# Patient Record
Sex: FEMALE | Employment: FULL TIME | URBAN - METROPOLITAN AREA
[De-identification: names, ages, dates, MRNs, and addresses within clinical notes are randomized per-mention and may not be internally consistent; named-entity substitution may affect disease eponyms.]

---

## 2017-05-11 LAB — MAMMOGRAM: NORMAL

## 2019-06-11 ENCOUNTER — DOCUMENTATION ONLY (OUTPATIENT)
Dept: CARE COORDINATION | Facility: CLINIC | Age: 54
End: 2019-06-11

## 2019-06-12 ENCOUNTER — OFFICE VISIT (OUTPATIENT)
Dept: DERMATOLOGY | Facility: CLINIC | Age: 54
End: 2019-06-12
Payer: COMMERCIAL

## 2019-06-12 DIAGNOSIS — Z79.899 ENCOUNTER FOR LONG-TERM (CURRENT) USE OF HIGH-RISK MEDICATION: ICD-10-CM

## 2019-06-12 DIAGNOSIS — L40.3 PUSTULAR PSORIASIS OF PALMS AND SOLES: Primary | ICD-10-CM

## 2019-06-12 DIAGNOSIS — B35.1 ONYCHOMYCOSIS: ICD-10-CM

## 2019-06-12 LAB
ALBUMIN SERPL-MCNC: 4 G/DL (ref 3.4–5)
ALP SERPL-CCNC: 78 U/L (ref 40–150)
ALT SERPL W P-5'-P-CCNC: 36 U/L (ref 0–50)
AST SERPL W P-5'-P-CCNC: 27 U/L (ref 0–45)
BASOPHILS # BLD AUTO: 0.1 10E9/L (ref 0–0.2)
BASOPHILS NFR BLD AUTO: 0.6 %
BILIRUB DIRECT SERPL-MCNC: 0.1 MG/DL (ref 0–0.2)
BILIRUB SERPL-MCNC: 0.6 MG/DL (ref 0.2–1.3)
DIFFERENTIAL METHOD BLD: ABNORMAL
EOSINOPHIL # BLD AUTO: 0.3 10E9/L (ref 0–0.7)
EOSINOPHIL NFR BLD AUTO: 4.4 %
ERYTHROCYTE [DISTWIDTH] IN BLOOD BY AUTOMATED COUNT: 17.6 % (ref 10–15)
HCT VFR BLD AUTO: 37 % (ref 35–47)
HGB BLD-MCNC: 11.7 G/DL (ref 11.7–15.7)
IMM GRANULOCYTES # BLD: 0 10E9/L (ref 0–0.4)
IMM GRANULOCYTES NFR BLD: 0.3 %
LYMPHOCYTES # BLD AUTO: 2.6 10E9/L (ref 0.8–5.3)
LYMPHOCYTES NFR BLD AUTO: 33.1 %
MCH RBC QN AUTO: 27.6 PG (ref 26.5–33)
MCHC RBC AUTO-ENTMCNC: 31.6 G/DL (ref 31.5–36.5)
MCV RBC AUTO: 87 FL (ref 78–100)
MONOCYTES # BLD AUTO: 0.7 10E9/L (ref 0–1.3)
MONOCYTES NFR BLD AUTO: 9.2 %
NEUTROPHILS # BLD AUTO: 4 10E9/L (ref 1.6–8.3)
NEUTROPHILS NFR BLD AUTO: 52.4 %
NRBC # BLD AUTO: 0 10*3/UL
NRBC BLD AUTO-RTO: 0 /100
PLATELET # BLD AUTO: 256 10E9/L (ref 150–450)
PROT SERPL-MCNC: 7.7 G/DL (ref 6.8–8.8)
RBC # BLD AUTO: 4.24 10E12/L (ref 3.8–5.2)
WBC # BLD AUTO: 7.7 10E9/L (ref 4–11)

## 2019-06-12 RX ORDER — HYDROXYZINE PAMOATE 25 MG/1
25 CAPSULE ORAL
COMMUNITY
Start: 2019-05-23

## 2019-06-12 RX ORDER — CALCIPOTRIENE 50 UG/G
CREAM TOPICAL
COMMUNITY
Start: 2019-02-13 | End: 2024-03-15

## 2019-06-12 RX ORDER — FLUNISOLIDE 0.25 MG/ML
SOLUTION NASAL
Refills: 6 | COMMUNITY
Start: 2019-02-07 | End: 2024-03-22

## 2019-06-12 RX ORDER — HYDROCODONE BITARTRATE AND ACETAMINOPHEN 5; 325 MG/1; MG/1
1 TABLET ORAL 2 TIMES DAILY PRN
COMMUNITY
Start: 2019-05-23

## 2019-06-12 RX ORDER — SERTRALINE HYDROCHLORIDE 100 MG/1
100 TABLET, FILM COATED ORAL DAILY
COMMUNITY
Start: 2019-05-07

## 2019-06-12 RX ORDER — ESOMEPRAZOLE MAGNESIUM 40 MG/1
40 CAPSULE, DELAYED RELEASE ORAL
COMMUNITY
Start: 2018-12-12 | End: 2024-03-22

## 2019-06-12 RX ORDER — SUCRALFATE 1 G/1
1 TABLET ORAL
COMMUNITY
Start: 2018-11-08 | End: 2024-03-22

## 2019-06-12 RX ORDER — LOSARTAN POTASSIUM 100 MG/1
100 TABLET ORAL DAILY
COMMUNITY
Start: 2018-09-26

## 2019-06-12 RX ORDER — FLUOROMETHOLONE 0.1 %
SUSPENSION, DROPS(FINAL DOSAGE FORM)(ML) OPHTHALMIC (EYE)
COMMUNITY
Start: 2019-05-13 | End: 2024-03-22

## 2019-06-12 RX ORDER — HALOBETASOL PROPIONATE 0.5 MG/G
CREAM TOPICAL 2 TIMES DAILY
Qty: 50 G | Refills: 3 | Status: SHIPPED | OUTPATIENT
Start: 2019-06-12 | End: 2022-03-17

## 2019-06-12 RX ORDER — HYDROXYZINE HYDROCHLORIDE 25 MG/1
TABLET, FILM COATED ORAL
Refills: 3 | COMMUNITY
Start: 2019-05-23 | End: 2024-03-15

## 2019-06-12 RX ORDER — BACLOFEN 10 MG/1
10 TABLET ORAL
COMMUNITY
Start: 2018-12-12

## 2019-06-12 RX ORDER — ATORVASTATIN CALCIUM 40 MG/1
40 TABLET, FILM COATED ORAL
COMMUNITY
Start: 2018-09-26 | End: 2024-03-22

## 2019-06-12 RX ORDER — CLOBETASOL PROPIONATE 0.5 MG/G
OINTMENT TOPICAL
Refills: 3 | COMMUNITY
Start: 2019-02-13 | End: 2022-03-17

## 2019-06-12 RX ORDER — METOPROLOL SUCCINATE 100 MG/1
100 TABLET, EXTENDED RELEASE ORAL DAILY
Refills: 3 | COMMUNITY
Start: 2019-04-22 | End: 2024-03-22

## 2019-06-12 RX ORDER — BETAMETHASONE DIPROPIONATE 0.5 MG/G
CREAM TOPICAL
COMMUNITY
Start: 2018-07-25 | End: 2022-03-17

## 2019-06-12 RX ORDER — ONDANSETRON 4 MG/1
4 TABLET, ORALLY DISINTEGRATING ORAL
COMMUNITY
Start: 2018-12-12 | End: 2024-03-22

## 2019-06-12 RX ORDER — LOTEPREDNOL ETABONATE 5 MG/ML
SUSPENSION/ DROPS OPHTHALMIC
COMMUNITY
Start: 2019-05-07 | End: 2024-03-22

## 2019-06-12 RX ORDER — FLUNISOLIDE 0.25 MG/ML
2 SOLUTION NASAL
COMMUNITY
Start: 2019-02-06 | End: 2024-03-22

## 2019-06-12 RX ORDER — TRIAMCINOLONE ACETONIDE 1 MG/G
OINTMENT TOPICAL 2 TIMES DAILY
Refills: 0 | COMMUNITY
Start: 2019-04-26 | End: 2024-03-15

## 2019-06-12 RX ORDER — CRISABOROLE 20 MG/G
OINTMENT TOPICAL
Refills: 1 | COMMUNITY
Start: 2018-12-21 | End: 2020-01-30

## 2019-06-12 RX ORDER — VALACYCLOVIR HYDROCHLORIDE 500 MG/1
TABLET, FILM COATED ORAL
Refills: 4 | COMMUNITY
Start: 2019-06-05 | End: 2024-03-22

## 2019-06-12 RX ORDER — FLUCONAZOLE 100 MG/1
TABLET ORAL
Refills: 0 | COMMUNITY
Start: 2019-04-05 | End: 2024-03-22

## 2019-06-12 RX ORDER — LEVOTHYROXINE SODIUM 150 UG/1
150 TABLET ORAL DAILY
COMMUNITY
Start: 2019-05-23

## 2019-06-12 RX ORDER — CLONAZEPAM 1 MG/1
1 TABLET ORAL
COMMUNITY
Start: 2018-12-12 | End: 2024-03-22

## 2019-06-12 ASSESSMENT — PAIN SCALES - GENERAL: PAINLEVEL: SEVERE PAIN (7)

## 2019-06-12 NOTE — NURSING NOTE
"Dermatology Rooming Note    Eulalia Mendez's goals for this visit include:   Chief Complaint   Patient presents with     Derm Problem     Eulalia is here today to be seen for pustlar psoriasis- Eulalia states \"it's causing me pain everywhere\".      Jordana Warner, RMJENNIFFER     "

## 2019-06-12 NOTE — PROGRESS NOTES
"[facility] Dermatology Note      Dermatology Problem List:  1. 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 Originally diagnosed at [place] in 2014.  -current tx: Stelara 45mg/ml, trying to get prior auth approved for 90mg/ml  -previous tx: Cosentyx - stopped due to numerous intolerable side effects (SOB, aphthous ulcers, thrush and itching), methotrexate (did not help), Humira (stopped working) , Enbrel (stopped working), various topical corticosteroids, calcipotriene  2. Hx of RA and hypothyroidism - rheum prefers her to be on biologic due to hx of RA     Encounter Date: Jun 12, 2019    CC:  Chief Complaint   Patient presents with     Derm Problem     Eulalia is here today to be seen for pustlar psoriasis- Eulalia states \"it's causing me pain everywhere\".          History of Present Illness:  Ms. Eulalia Mendez is a 54 year old female who is new to the dermatology clinic present for pustular psoriasis. Patient has a history of Rheumatoid arthritis and hypothyroidism. Patient had been worked up at [place] which indicated she had pustular psoriasis. Patient was diagnosed with pustular psoriasis in 2014. Patient was put on Stelara 90mg/ml in 2015 which controled the pustular psoriasis fairly well. She would get breakthrough plaques at about 10 weeks on the 90mg/ml dose. Patient's rheumatologist wanted to try a different medication and she was taken off Stelara and was put on Cosentyx. Patient was on cosentyx from January though April of 2018. Patient discontinued this due to intolerable side effects (SOB, aphthous ulcers, thrush and itching). Now patient is currently on 45mg of Stelara again, but the previous dermatologist she saw was not able to get her on 90, sos he is switching providers now int he hopes that she can again get the 90mg dose approved. Patient is also currently on tramadol, hydroxyzine, and " norco for sx associated with RA. She is alternating these steroids and using the calcipotriene at night. Her most recent injection of Stelara was 4 days ago and she has not seen her pustular PSO calm down. She has been off biologic treatment for several months.    In the past patient has been on methotrexate (not helpful), Cosentyx (intolerable side effects), Humira (not helpful), Stelara (helpful at 90mg/ml, 45mg was not helpful), Enbrel(no helpful), topical and oral steroids - only very minimally helpful.    Patient has to bandage her feet daily in order to be able to walk as the fissuring and inflammation is extremely painful.     Past Medical History:   There is no problem list on file for this patient.    History reviewed. No pertinent past medical history.  History reviewed. No pertinent surgical history.    Social History:   reports that she has been smoking.  She has never used smokeless tobacco.    Family History:  Family History   Problem Relation Age of Onset     Melanoma No family hx of      Skin Cancer No family hx of        Medications:  Current Outpatient Medications   Medication Sig Dispense Refill     atorvastatin (LIPITOR) 40 MG tablet Take 40 mg by mouth       baclofen (LIORESAL) 10 MG tablet Take 10 mg by mouth       betamethasone dipropionate (DIPROSONE) 0.05 % external cream        calcipotriene (DOVONOX) 0.005 % external cream APPLY TO AFFECTED AREA TWICE A DAY       clonazePAM (KLONOPIN) 1 MG tablet Take 1 mg by mouth       esomeprazole (NEXIUM) 40 MG DR capsule Take 40 mg by mouth       flunisolide (NASALIDE) 25 MCG/ACT (0.025%) SOLN spray Spray 2 sprays in nostril       fluorometholone (FML LIQUIFILM) 0.1 % ophthalmic suspension I drop 3 times a day for 1 week then once a day for 1 week then STOP       HYDROcodone-acetaminophen (NORCO) 5-325 MG tablet Take 1-2 tablets by mouth       hydrOXYzine (VISTARIL) 25 MG capsule Take 25 mg by mouth       levothyroxine (SYNTHROID/LEVOTHROID) 175 MCG  tablet Take 175 mcg by mouth       losartan (COZAAR) 50 MG tablet Take 50 mg by mouth       loteprednol (LOTEMAX) 0.5 % ophthalmic suspension Use 1 drop in both eyes 3 times a day for 1 week then once a day for 1 week then STOP       ondansetron (ZOFRAN-ODT) 4 MG ODT tab Place 4 mg under the tongue       sertraline (ZOLOFT) 50 MG tablet        sucralfate (CARAFATE) 1 GM tablet Take 1 g by mouth       ustekinumab (STELARA) 90 MG/ML Inject 90 mg Subcutaneous       clobetasol (TEMOVATE) 0.05 % external ointment APPLY TO FEET TWICE DAILY X 4 DAYS PER WEEK  3     EUCRISA 2 % ointment APPLY TO AFFECTED AREA TWICE A DAY  1     fluconazole (DIFLUCAN) 100 MG tablet TAKE 2 TABLETS BY MOUTH ON DAY 1, THEN TAKE 1 TABLET DAILY FOR 6 DAYS.  0     flunisolide (NASALIDE) 25 MCG/ACT (0.025%) SOLN spray INHALE 2 SPRAYS INTO BOTH NOSTRILS 2 TIMES DAILY.  6     hydrOXYzine (ATARAX) 25 MG tablet TAKE 1 TABLET BY MOUTH EVERY 8 HOURS IF NEEDED (BEST AT BEDTIME).  3     metoprolol succinate ER (TOPROL-XL) 100 MG 24 hr tablet Take 100 mg by mouth daily  3     ranitidine (ZANTAC) 150 MG tablet TAKE 1 TABLET BY MOUTH TWICE A DAY  3     triamcinolone (KENALOG) 0.1 % external ointment Apply topically 2 times daily  0     valACYclovir (VALTREX) 500 MG tablet TAKE 1 TABLET BY MOUTH TWICE A DAY FOR 3 DAYS  4       Allergies   Allergen Reactions     Ace Inhibitors Cough and Other (See Comments)     Cough       Review of Systems:  -Constitutional: The patient denies fatigue, fevers, chills, unintended weight loss, and night sweats.  -HEENT: Patient denies nonhealing oral sores.  -Skin: As above in HPI. No additional skin concerns.  -MSK: Denies arthralgias currently    Physical exam:  Vitals: There were no vitals taken for this visit. 77 kg   GEN: This is a well developed, well-nourished female in no acute distress, in a pleasant mood.    SKIN: Total skin excluding the undergarment areas was performed. The exam included the head/face, neck, both  arms, chest, back, abdomen, both legs, digits and/or nails.   -Onycholysis of the distal finger nails  -Distal finger trips there is cracking, fissuring, erosion, and scale  -On the bilateral planter feet there is numerous intact pustules that range in size from 1 mm to 4 mm. There is cracking fissuring, erosion and scale  -Left great toe there is yellow thickening and oncolysis   - on the bilateral ankles,  left shin, bilateral calfs, left groin, occipital scalp there is erythematous plaques with silvery overlying scale  -No other lesions of concern on areas examined.       Impression/Plan:  1. Onychomycosis - left great toe nail    Clipping for fungal culture obtained today, if positive and nml LFTs will plan to start terbinafine 250mg po every day x 3mo    2. 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 Was very stable on Stelara 90mg/ml every 3mo in the past, but would get breakthrough plaques about 2-3 weeks prior to her next injection. For that reason her previous dermatologist switched her to Cosentyx, however patient was unable to tolerate side effects. Therefore, she would like to go back on Stelara 90mg/ml. She saw an outside dermatologist in the interm and is currently on Stelara 45mg/ml, but she does not feel this will adequately control her disease.    Will attempt to get approval for Stelara 90 mg/ml. For now may continue with Stelarta 45mg/ml    If 90mg is approved but patient is still getting breakthrough will discuss option of getting approved for additional injections to be spaced out more frequently than every 3mo     Start Halobetsol 0.05% cream, apply topically 2 times daily, may continue with other topicals (clobetasol, triamcinolone etc) as she has been doing. Patient understands to avoid overuse to avoid atrophy and to avoid use on the face/groin.    CBC, Hepatic Panel, TB Gold ordered  today    Educated patient on GoodRx     Photograph was obtained for clinical monitoring and inclusion in medical record.    CC Dr. Pena on close of this encounter.  Follow-up in 3 months, earlier for new or changing lesions.       Staff Involved:  Staff/Scribe    Scribe Disclosure:  I, Marly Hough, am serving as a scribe to document services personally performed by Margarita Brandt PA-C, based on data collection and the provider's statements to me.     Provider Disclosure:   The documentation recorded by the scribe accurately reflects the services I personally performed and the decisions made by me.    All risks, benefits and alternatives were discussed with patient.  Patient is in agreement and understands the assessment and plan.  All questions were answered.    Margarita Brandt PA-C  [facility]: Phone: [phone], Fax: [phone]

## 2019-06-12 NOTE — LETTER
"6/12/2019       RE: Eulalia Mendez  [address] [facility] [address]     Dear Colleague,    Thank you for referring your patient, Eulalia Mendez, to the [facility] DERMATOLOGY at [facility]. Please see a copy of my visit note below.    [facility] Dermatology Note      Dermatology Problem List:  1. 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 Originally diagnosed at [place] in 2014.  -current tx: Stelara 45mg/ml, trying to get prior auth approved for 90mg/ml  -previous tx: Cosentyx - stopped due to numerous intolerable side effects (SOB, aphthous ulcers, thrush and itching), methotrexate (did not help), Humira (stopped working) , Enbrel (stopped working), various topical corticosteroids, calcipotriene  2. Hx of RA and hypothyroidism - rheum prefers her to be on biologic due to hx of RA     Encounter Date: Jun 12, 2019    CC:  Chief Complaint   Patient presents with     Derm Problem     Eulalia is here today to be seen for pustlar psoriasis- Eulalia states \"it's causing me pain everywhere\".          History of Present Illness:  Ms. Eulalia Mendez is a 54 year old female who is new to the dermatology clinic present for pustular psoriasis. Patient has a history of Rheumatoid arthritis and hypothyroidism. Patient had been worked up at [place] which indicated she had pustular psoriasis. Patient was diagnosed with pustular psoriasis in 2014. Patient was put on Stelara 90mg/ml in 2015 which controled the pustular psoriasis fairly well. She would get breakthrough plaques at about 10 weeks on the 90mg/ml dose. Patient's rheumatologist wanted to try a different medication and she was taken off Stelara and was put on Cosentyx. Patient was on cosentyx from January though April of 2018. Patient discontinued this due to intolerable side effects (SOB, aphthous ulcers, thrush and itching). " Now patient is currently on 45mg of Stelara again, but the previous dermatologist she saw was not able to get her on 90, sos he is switching providers now int he hopes that she can again get the 90mg dose approved. Patient is also currently on tramadol, hydroxyzine, and norco for sx associated with RA. She is alternating these steroids and using the calcipotriene at night. Her most recent injection of Stelara was 4 days ago and she has not seen her pustular PSO calm down. She has been off biologic treatment for several months.    In the past patient has been on methotrexate (not helpful), Cosentyx (intolerable side effects), Humira (not helpful), Stelara (helpful at 90mg/ml, 45mg was not helpful), Enbrel(no helpful), topical and oral steroids - only very minimally helpful.    Patient has to bandage her feet daily in order to be able to walk as the fissuring and inflammation is extremely painful.     Past Medical History:   There is no problem list on file for this patient.    History reviewed. No pertinent past medical history.  History reviewed. No pertinent surgical history.    Social History:   reports that she has been smoking.  She has never used smokeless tobacco.    Family History:  Family History   Problem Relation Age of Onset     Melanoma No family hx of      Skin Cancer No family hx of        Medications:  Current Outpatient Medications   Medication Sig Dispense Refill     atorvastatin (LIPITOR) 40 MG tablet Take 40 mg by mouth       baclofen (LIORESAL) 10 MG tablet Take 10 mg by mouth       betamethasone dipropionate (DIPROSONE) 0.05 % external cream        calcipotriene (DOVONOX) 0.005 % external cream APPLY TO AFFECTED AREA TWICE A DAY       clonazePAM (KLONOPIN) 1 MG tablet Take 1 mg by mouth       esomeprazole (NEXIUM) 40 MG DR capsule Take 40 mg by mouth       flunisolide (NASALIDE) 25 MCG/ACT (0.025%) SOLN spray Spray 2 sprays in nostril       fluorometholone (FML LIQUIFILM) 0.1 % ophthalmic  suspension I drop 3 times a day for 1 week then once a day for 1 week then STOP       HYDROcodone-acetaminophen (NORCO) 5-325 MG tablet Take 1-2 tablets by mouth       hydrOXYzine (VISTARIL) 25 MG capsule Take 25 mg by mouth       levothyroxine (SYNTHROID/LEVOTHROID) 175 MCG tablet Take 175 mcg by mouth       losartan (COZAAR) 50 MG tablet Take 50 mg by mouth       loteprednol (LOTEMAX) 0.5 % ophthalmic suspension Use 1 drop in both eyes 3 times a day for 1 week then once a day for 1 week then STOP       ondansetron (ZOFRAN-ODT) 4 MG ODT tab Place 4 mg under the tongue       sertraline (ZOLOFT) 50 MG tablet        sucralfate (CARAFATE) 1 GM tablet Take 1 g by mouth       ustekinumab (STELARA) 90 MG/ML Inject 90 mg Subcutaneous       clobetasol (TEMOVATE) 0.05 % external ointment APPLY TO FEET TWICE DAILY X 4 DAYS PER WEEK  3     EUCRISA 2 % ointment APPLY TO AFFECTED AREA TWICE A DAY  1     fluconazole (DIFLUCAN) 100 MG tablet TAKE 2 TABLETS BY MOUTH ON DAY 1, THEN TAKE 1 TABLET DAILY FOR 6 DAYS.  0     flunisolide (NASALIDE) 25 MCG/ACT (0.025%) SOLN spray INHALE 2 SPRAYS INTO BOTH NOSTRILS 2 TIMES DAILY.  6     hydrOXYzine (ATARAX) 25 MG tablet TAKE 1 TABLET BY MOUTH EVERY 8 HOURS IF NEEDED (BEST AT BEDTIME).  3     metoprolol succinate ER (TOPROL-XL) 100 MG 24 hr tablet Take 100 mg by mouth daily  3     ranitidine (ZANTAC) 150 MG tablet TAKE 1 TABLET BY MOUTH TWICE A DAY  3     triamcinolone (KENALOG) 0.1 % external ointment Apply topically 2 times daily  0     valACYclovir (VALTREX) 500 MG tablet TAKE 1 TABLET BY MOUTH TWICE A DAY FOR 3 DAYS  4       Allergies   Allergen Reactions     Ace Inhibitors Cough and Other (See Comments)     Cough       Review of Systems:  -Constitutional: The patient denies fatigue, fevers, chills, unintended weight loss, and night sweats.  -HEENT: Patient denies nonhealing oral sores.  -Skin: As above in HPI. No additional skin concerns.  -MSK: Denies arthralgias currently    Physical  exam:  Vitals: There were no vitals taken for this visit. 77 kg   GEN: This is a well developed, well-nourished female in no acute distress, in a pleasant mood.    SKIN: Total skin excluding the undergarment areas was performed. The exam included the head/face, neck, both arms, chest, back, abdomen, both legs, digits and/or nails.   -Onycholysis of the distal finger nails  -Distal finger trips there is cracking, fissuring, erosion, and scale  -On the bilateral planter feet there is numerous intact pustules that range in size from 1 mm to 4 mm. There is cracking fissuring, erosion and scale  -Left great toe there is yellow thickening and oncolysis   - on the bilateral ankles,  left shin, bilateral calfs, left groin, occipital scalp there is erythematous plaques with silvery overlying scale  -No other lesions of concern on areas examined.       Impression/Plan:  1. Onychomycosis - left great toe nail    Clipping for fungal culture obtained today, if positive and nml LFTs will plan to start terbinafine 250mg po every day x 3mo    2. 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 Was very stable on Stelara 90mg/ml every 3mo in the past, but would get breakthrough plaques about 2-3 weeks prior to her next injection. For that reason her previous dermatologist switched her to Cosentyx, however patient was unable to tolerate side effects. Therefore, she would like to go back on Stelara 90mg/ml. She saw an outside dermatologist in the interm and is currently on Stelara 45mg/ml, but she does not feel this will adequately control her disease.    Will attempt to get approval for Stelara 90 mg/ml. For now may continue with Stelarta 45mg/ml    If 90mg is approved but patient is still getting breakthrough will discuss option of getting approved for additional injections to be spaced out more frequently than every 3mo     Start Halobetsol  0.05% cream, apply topically 2 times daily, may continue with other topicals (clobetasol, triamcinolone etc) as she has been doing. Patient understands to avoid overuse to avoid atrophy and to avoid use on the face/groin.    CBC, Hepatic Panel, TB Gold ordered today    Educated patient on GoodRx     Photograph was obtained for clinical monitoring and inclusion in medical record.    CC Dr. Pena on close of this encounter.  Follow-up in 3 months, earlier for new or changing lesions.       Staff Involved:  Staff/Scribe    Scribe Disclosure:  I, Marly Hough, am serving as a scribe to document services personally performed by Margarita Brandt PA-C, based on data collection and the provider's statements to me.     Provider Disclosure:   The documentation recorded by the scribe accurately reflects the services I personally performed and the decisions made by me.    All risks, benefits and alternatives were discussed with patient.  Patient is in agreement and understands the assessment and plan.  All questions were answered.    Margarita Brandt PA-C  [facility]: Phone: [phone], Fax: [phone]

## 2019-06-14 LAB
COPATH REPORT: NORMAL
GAMMA INTERFERON BACKGROUND BLD IA-ACNC: 0.03 IU/ML
M TB IFN-G BLD-IMP: NEGATIVE
M TB IFN-G CD4+ BCKGRND COR BLD-ACNC: >10 IU/ML
MITOGEN IGNF BCKGRD COR BLD-ACNC: 0 IU/ML
MITOGEN IGNF BCKGRD COR BLD-ACNC: 0 IU/ML

## 2019-06-26 ENCOUNTER — TELEPHONE (OUTPATIENT)
Dept: DERMATOLOGY | Facility: CLINIC | Age: 54
End: 2019-06-26

## 2019-06-26 DIAGNOSIS — L40.3 PUSTULAR PSORIASIS OF PALMS AND SOLES: ICD-10-CM

## 2019-06-26 NOTE — TELEPHONE ENCOUNTER
PA Initiation    Medication: Stelara 90mg/ mL syringes - RENEWAL  Insurance Company: [employer] - Phone [phone] Fax [phone]  Pharmacy Filling the Rx: [facility] [place]  Filling Pharmacy Phone: [phone]  Filling Pharmacy Fax:    Start Date: 6/26/2019    ** Currently on Stelara; updated prior auth needed; pt declines to [place] (already filling with [facility])

## 2019-06-27 NOTE — TELEPHONE ENCOUNTER
Prior Authorization Approval    Authorization Effective Date: 6/10/2019  Authorization Expiration Date: 6/10/2020  Medication: Stelara 90mg/ mL syringes   Approved Dose/Quantity: 90mg every 12 weeks (max doses of 5 per year)  Reference #: CMM key# [id number]   Insurance Company: [employer] - Phone [phone] Fax [phone]  Expected CoPay:       CoPay Card Available: Yes    Foundation Assistance Needed:    Which Pharmacy is filling the prescription (Not needed for infusion/clinic administered): [facility] - [address] [place] [address] [facility]  Pharmacy Notified: Yes  Patient Notified:      **PA already in place for Stelara (thru medical benefit)

## 2019-07-23 ENCOUNTER — TELEPHONE (OUTPATIENT)
Dept: DERMATOLOGY | Facility: CLINIC | Age: 54
End: 2019-07-23

## 2019-07-23 DIAGNOSIS — L40.3 PUSTULAR PSORIASIS OF PALMS AND SOLES: ICD-10-CM

## 2019-07-23 NOTE — TELEPHONE ENCOUNTER
Health Call Center    Phone Message    May a detailed message be left on voicemail: yes    Reason for Call: Medication Refill Request    Has the patient contacted the pharmacy for the refill? Yes   Name of medication being requested: ustekinumab (STELARA) 90 MG/ML    Provider who prescribed the medication: Margarita Brandt  [facility] [address]  Date medication is needed: when possible       Action Taken: Message routed to:  [facility] ([facility] med refill

## 2019-07-23 NOTE — TELEPHONE ENCOUNTER
ustekinumab (STELARA) 90 MG/ML      Last Written Prescription Date: 6-26-19  Last Fill Quantity: 1 ml,   # refills: 0  Last Office Visit : 6-12-19  Future Office visit:  9-13-19    Routing refill request to provider for review/approval because:  Not on protocol.      Kathleen M Doege RN

## 2019-07-24 DIAGNOSIS — L40.3 PUSTULAR PSORIASIS OF PALMS AND SOLES: ICD-10-CM

## 2019-07-24 NOTE — TELEPHONE ENCOUNTER
Received refill request for stelara as the resident on call. Reviewed patient's chart and attached communication. Patient last seen 6/12/19 for psoriasis. RTC 3 months. After reviewing the medication list and assessment and plan from last visit, the refill request was accepted.    Patrice Villarreal MD  Dermatology Resident, PGY4

## 2019-07-26 ENCOUNTER — TELEPHONE (OUTPATIENT)
Dept: DERMATOLOGY | Facility: CLINIC | Age: 54
End: 2019-07-26

## 2019-07-26 NOTE — TELEPHONE ENCOUNTER
Called Eulalia and RIKKI. I wanted to speak with her about her Stellara medication. Clinic number provided.   MCKAYLA Harkins

## 2019-09-13 ENCOUNTER — OFFICE VISIT (OUTPATIENT)
Dept: DERMATOLOGY | Facility: CLINIC | Age: 54
End: 2019-09-13
Payer: COMMERCIAL

## 2019-09-13 DIAGNOSIS — Z79.899 ENCOUNTER FOR LONG-TERM (CURRENT) USE OF HIGH-RISK MEDICATION: ICD-10-CM

## 2019-09-13 DIAGNOSIS — L40.3 PUSTULAR PSORIASIS OF PALMS AND SOLES: Primary | ICD-10-CM

## 2019-09-13 LAB
ALBUMIN SERPL-MCNC: 3.9 G/DL (ref 3.4–5)
ALP SERPL-CCNC: 68 U/L (ref 40–150)
ALT SERPL W P-5'-P-CCNC: 38 U/L (ref 0–50)
ANION GAP SERPL CALCULATED.3IONS-SCNC: 8 MMOL/L (ref 3–14)
AST SERPL W P-5'-P-CCNC: 23 U/L (ref 0–45)
BASOPHILS # BLD AUTO: 0.1 10E9/L (ref 0–0.2)
BASOPHILS NFR BLD AUTO: 0.8 %
BILIRUB SERPL-MCNC: 0.6 MG/DL (ref 0.2–1.3)
BUN SERPL-MCNC: 11 MG/DL (ref 7–30)
CALCIUM SERPL-MCNC: 9 MG/DL (ref 8.5–10.1)
CHLORIDE SERPL-SCNC: 104 MMOL/L (ref 94–109)
CO2 SERPL-SCNC: 26 MMOL/L (ref 20–32)
CREAT SERPL-MCNC: 0.64 MG/DL (ref 0.52–1.04)
DIFFERENTIAL METHOD BLD: ABNORMAL
EOSINOPHIL # BLD AUTO: 0.2 10E9/L (ref 0–0.7)
EOSINOPHIL NFR BLD AUTO: 3 %
ERYTHROCYTE [DISTWIDTH] IN BLOOD BY AUTOMATED COUNT: 17.3 % (ref 10–15)
GFR SERPL CREATININE-BSD FRML MDRD: >90 ML/MIN/{1.73_M2}
GLUCOSE SERPL-MCNC: 90 MG/DL (ref 70–99)
HCT VFR BLD AUTO: 38.4 % (ref 35–47)
HGB BLD-MCNC: 12.1 G/DL (ref 11.7–15.7)
IMM GRANULOCYTES # BLD: 0 10E9/L (ref 0–0.4)
IMM GRANULOCYTES NFR BLD: 0.4 %
LYMPHOCYTES # BLD AUTO: 2.4 10E9/L (ref 0.8–5.3)
LYMPHOCYTES NFR BLD AUTO: 30 %
MCH RBC QN AUTO: 28.4 PG (ref 26.5–33)
MCHC RBC AUTO-ENTMCNC: 31.5 G/DL (ref 31.5–36.5)
MCV RBC AUTO: 90 FL (ref 78–100)
MONOCYTES # BLD AUTO: 0.6 10E9/L (ref 0–1.3)
MONOCYTES NFR BLD AUTO: 8 %
NEUTROPHILS # BLD AUTO: 4.6 10E9/L (ref 1.6–8.3)
NEUTROPHILS NFR BLD AUTO: 57.8 %
NRBC # BLD AUTO: 0 10*3/UL
NRBC BLD AUTO-RTO: 0 /100
PLATELET # BLD AUTO: 237 10E9/L (ref 150–450)
POTASSIUM SERPL-SCNC: 4 MMOL/L (ref 3.4–5.3)
PROT SERPL-MCNC: 7.7 G/DL (ref 6.8–8.8)
RBC # BLD AUTO: 4.26 10E12/L (ref 3.8–5.2)
SODIUM SERPL-SCNC: 138 MMOL/L (ref 133–144)
WBC # BLD AUTO: 8 10E9/L (ref 4–11)

## 2019-09-13 ASSESSMENT — PAIN SCALES - GENERAL: PAINLEVEL: SEVERE PAIN (7)

## 2019-09-13 NOTE — LETTER
"9/13/2019       RE: Eulalia Mendez  [address] [facility] [address]     Dear Colleague,    Thank you for referring your patient, Eulalia Mendez, to the [facility] DERMATOLOGY at [facility]. Please see a copy of my visit note below.    [facility] Dermatology Note      Dermatology Problem List:  1. 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 Originally diagnosed at [place] in 2014.  -current tx: Stelara 90mg/ml, halobetasol 0.05% ointment  -previous tx: Cosentyx - stopped due to numerous intolerable side effects (SOB, aphthous ulcers, thrush and itching), methotrexate (did not help), Humira (stopped working) , Enbrel (stopped working), various topical corticosteroids, calcipotriene  2. Hx of RA and hypothyroidism - rheum prefers her to be on biologic due to hx of RA     Encounter Date: Sep 13, 2019    CC:  Chief Complaint   Patient presents with     Derm Problem     Eulalia is here today for a psoraisis follow up- Eulalia states \"it's better\".        History of Present Illness:  Ms. Eulalia Mendez is a 54 year old female who presents as a follow-up for psoriasis. The patient was last seen on 06/12/2019 when Stelara 45mg/ml was continued but approval for 90mg/ml was sought and halobetsol 0.05% cream BID was started for psoriasis on this date.    At today's visit, the patient notes she was able to obtain approval for Stelara 90mg/ml and has been using this medication every 12 weeks. She has now had two 90mg/ml Stelara injections. The patient notes improvement in her psoriasis overall. Aside from her palms and soles the rest of the plaques have resolved (legs, groin, scalp). She states things improve and then taper off in conjunction with the timing of her injections. She gets about 10 really good weeks and then two weeks before she is due for her next injection things " begin to flare again. Feet are less painful, but she does have to still bandage them. She denies any recent, infections, illnesses or hospitalizations. The patient denies painful, itching, tingling or bleeding lesions unless otherwise noted.    Past Medical History:   There is no problem list on file for this patient.    No past medical history on file.  No past surgical history on file.    Social History:   reports that she has been smoking.  She has never used smokeless tobacco.    Family History:  Family History   Problem Relation Age of Onset     Melanoma No family hx of      Skin Cancer No family hx of        Medications:  Current Outpatient Medications   Medication Sig Dispense Refill     atorvastatin (LIPITOR) 40 MG tablet Take 40 mg by mouth       baclofen (LIORESAL) 10 MG tablet Take 10 mg by mouth       betamethasone dipropionate (DIPROSONE) 0.05 % external cream        calcipotriene (DOVONOX) 0.005 % external cream APPLY TO AFFECTED AREA TWICE A DAY       clobetasol (TEMOVATE) 0.05 % external ointment APPLY TO FEET TWICE DAILY X 4 DAYS PER WEEK  3     clonazePAM (KLONOPIN) 1 MG tablet Take 1 mg by mouth       esomeprazole (NEXIUM) 40 MG DR capsule Take 40 mg by mouth       EUCRISA 2 % ointment APPLY TO AFFECTED AREA TWICE A DAY  1     fluconazole (DIFLUCAN) 100 MG tablet TAKE 2 TABLETS BY MOUTH ON DAY 1, THEN TAKE 1 TABLET DAILY FOR 6 DAYS.  0     flunisolide (NASALIDE) 25 MCG/ACT (0.025%) SOLN spray Spray 2 sprays in nostril       flunisolide (NASALIDE) 25 MCG/ACT (0.025%) SOLN spray INHALE 2 SPRAYS INTO BOTH NOSTRILS 2 TIMES DAILY.  6     fluorometholone (FML LIQUIFILM) 0.1 % ophthalmic suspension I drop 3 times a day for 1 week then once a day for 1 week then STOP       halobetasol (ULTRAVATE) 0.05 % external cream Apply topically 2 times daily 50 g 3     HYDROcodone-acetaminophen (NORCO) 5-325 MG tablet Take 1-2 tablets by mouth       hydrOXYzine (ATARAX) 25 MG tablet TAKE 1 TABLET BY MOUTH EVERY 8  HOURS IF NEEDED (BEST AT BEDTIME).  3     hydrOXYzine (VISTARIL) 25 MG capsule Take 25 mg by mouth       levothyroxine (SYNTHROID/LEVOTHROID) 175 MCG tablet Take 175 mcg by mouth       losartan (COZAAR) 50 MG tablet Take 50 mg by mouth       loteprednol (LOTEMAX) 0.5 % ophthalmic suspension Use 1 drop in both eyes 3 times a day for 1 week then once a day for 1 week then STOP       metoprolol succinate ER (TOPROL-XL) 100 MG 24 hr tablet Take 100 mg by mouth daily  3     ondansetron (ZOFRAN-ODT) 4 MG ODT tab Place 4 mg under the tongue       ranitidine (ZANTAC) 150 MG tablet TAKE 1 TABLET BY MOUTH TWICE A DAY  3     sertraline (ZOLOFT) 50 MG tablet        sucralfate (CARAFATE) 1 GM tablet Take 1 g by mouth       triamcinolone (KENALOG) 0.1 % external ointment Apply topically 2 times daily  0     ustekinumab (STELARA) 90 MG/ML Inject 90 mg Subcutaneous       valACYclovir (VALTREX) 500 MG tablet TAKE 1 TABLET BY MOUTH TWICE A DAY FOR 3 DAYS  4       Allergies   Allergen Reactions     Ace Inhibitors Cough and Other (See Comments)     Cough       Review of Systems:  -Constitutional: The patient denies fatigue, fevers, chills, unintended weight loss, and night sweats.  -HEENT: Patient denies nonhealing oral sores.  -Skin: As above in HPI. No additional skin concerns.  -GI: no nausea, abdominal pain, vomiting, diarrhea or blood in the stool  -MSK: No arthralgias or myalgias    Physical exam:  Vitals: There were no vitals taken for this visit.  GEN: This is a well developed, well-nourished female in no acute distress, in a pleasant mood.    SKIN: Total skin excluding the undergarment areas was performed. The exam included the head/face, neck, both arms, chest, back, abdomen, both legs, digits and/or nails.   -Onycholysis of the distal finger nails  -Distal finger trips there is cracking, fissuring, erosion, and scale  but this is improved since last visit  -On the bilateral planter feet there is numerous intact pustules that  range in size from 1 mm to 4 mm. There is cracking fissuring, erosion and scale - but this is all thinner and improved since last visit  -Left great toe there is yellow thickening and oncolysis   -plaques on the bilateral ankles,  left shin, bilateral calfs, left groin, occipital scalp are nearly resolved  -Improvement since the last visit, fewer open cracks and milder scaling  -No other lesions of concern on areas examined.       Impression/Plan:  1. Pustular Psoriasis of the palms and soles    Continue Stelara 90 mg/ml. Prescription refilled today. If patient still noticing breakthrough pustules about 2 weeks prior to her next injection will consider trying to get her approved for injections every 10 weeks.     Continue halobetasol 0.05% cream, apply topically BID, may continue with other topicals. Prescription refilled today.    Photograph was obtained for clinical monitoring and inclusion in medical record.    Safety labs: CBC and CMP ordered today, will repeat TB gold in 1 year    CC Dr. Pena on close of this encounter.  Follow-up in 6 months, earlier for new or changing lesions.       Staff Involved:  Staff/Scribe    Scribe Disclosure:  I, Osiel Bridges, am serving as a scribe to document services personally performed by Margarita Brandt PA-C, based on data collection and the provider's statements to me.     Provider Disclosure:   The documentation recorded by the scribe accurately reflects the services I personally performed and the decisions made by me.    All risks, benefits and alternatives were discussed with patient.  Patient is in agreement and understands the assessment and plan.  All questions were answered.    Margarita Brandt PA-C  [facility] [place]: Phone: [phone], Fax: [phone]

## 2019-09-13 NOTE — PROGRESS NOTES
"[facility] Dermatology Note      Dermatology Problem List:  1. 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 Originally diagnosed at [place] in 2014.  -current tx: Stelara 90mg/ml, halobetasol 0.05% ointment  -previous tx: Cosentyx - stopped due to numerous intolerable side effects (SOB, aphthous ulcers, thrush and itching), methotrexate (did not help), Humira (stopped working) , Enbrel (stopped working), various topical corticosteroids, calcipotriene  2. Hx of RA and hypothyroidism - rheum prefers her to be on biologic due to hx of RA     Encounter Date: Sep 13, 2019    CC:  Chief Complaint   Patient presents with     Derm Problem     Eulalia is here today for a psoraisis follow up- Eulalia states \"it's better\".        History of Present Illness:  Ms. Eulalia Mendez is a 54 year old female who presents as a follow-up for psoriasis. The patient was last seen on 06/12/2019 when Stelara 45mg/ml was continued but approval for 90mg/ml was sought and halobetsol 0.05% cream BID was started for psoriasis on this date.    At today's visit, the patient notes she was able to obtain approval for Stelara 90mg/ml and has been using this medication every 12 weeks. She has now had two 90mg/ml Stelara injections. The patient notes improvement in her psoriasis overall. Aside from her palms and soles the rest of the plaques have resolved (legs, groin, scalp). She states things improve and then taper off in conjunction with the timing of her injections. She gets about 10 really good weeks and then two weeks before she is due for her next injection things begin to flare again. Feet are less painful, but she does have to still bandage them. She denies any recent, infections, illnesses or hospitalizations. The patient denies painful, itching, tingling or bleeding lesions unless otherwise noted.    Past Medical History:   There is " no problem list on file for this patient.    No past medical history on file.  No past surgical history on file.    Social History:   reports that she has been smoking.  She has never used smokeless tobacco.    Family History:  Family History   Problem Relation Age of Onset     Melanoma No family hx of      Skin Cancer No family hx of        Medications:  Current Outpatient Medications   Medication Sig Dispense Refill     atorvastatin (LIPITOR) 40 MG tablet Take 40 mg by mouth       baclofen (LIORESAL) 10 MG tablet Take 10 mg by mouth       betamethasone dipropionate (DIPROSONE) 0.05 % external cream        calcipotriene (DOVONOX) 0.005 % external cream APPLY TO AFFECTED AREA TWICE A DAY       clobetasol (TEMOVATE) 0.05 % external ointment APPLY TO FEET TWICE DAILY X 4 DAYS PER WEEK  3     clonazePAM (KLONOPIN) 1 MG tablet Take 1 mg by mouth       esomeprazole (NEXIUM) 40 MG DR capsule Take 40 mg by mouth       EUCRISA 2 % ointment APPLY TO AFFECTED AREA TWICE A DAY  1     fluconazole (DIFLUCAN) 100 MG tablet TAKE 2 TABLETS BY MOUTH ON DAY 1, THEN TAKE 1 TABLET DAILY FOR 6 DAYS.  0     flunisolide (NASALIDE) 25 MCG/ACT (0.025%) SOLN spray Spray 2 sprays in nostril       flunisolide (NASALIDE) 25 MCG/ACT (0.025%) SOLN spray INHALE 2 SPRAYS INTO BOTH NOSTRILS 2 TIMES DAILY.  6     fluorometholone (FML LIQUIFILM) 0.1 % ophthalmic suspension I drop 3 times a day for 1 week then once a day for 1 week then STOP       halobetasol (ULTRAVATE) 0.05 % external cream Apply topically 2 times daily 50 g 3     HYDROcodone-acetaminophen (NORCO) 5-325 MG tablet Take 1-2 tablets by mouth       hydrOXYzine (ATARAX) 25 MG tablet TAKE 1 TABLET BY MOUTH EVERY 8 HOURS IF NEEDED (BEST AT BEDTIME).  3     hydrOXYzine (VISTARIL) 25 MG capsule Take 25 mg by mouth       levothyroxine (SYNTHROID/LEVOTHROID) 175 MCG tablet Take 175 mcg by mouth       losartan (COZAAR) 50 MG tablet Take 50 mg by mouth       loteprednol (LOTEMAX) 0.5 % ophthalmic  suspension Use 1 drop in both eyes 3 times a day for 1 week then once a day for 1 week then STOP       metoprolol succinate ER (TOPROL-XL) 100 MG 24 hr tablet Take 100 mg by mouth daily  3     ondansetron (ZOFRAN-ODT) 4 MG ODT tab Place 4 mg under the tongue       ranitidine (ZANTAC) 150 MG tablet TAKE 1 TABLET BY MOUTH TWICE A DAY  3     sertraline (ZOLOFT) 50 MG tablet        sucralfate (CARAFATE) 1 GM tablet Take 1 g by mouth       triamcinolone (KENALOG) 0.1 % external ointment Apply topically 2 times daily  0     ustekinumab (STELARA) 90 MG/ML Inject 90 mg Subcutaneous       valACYclovir (VALTREX) 500 MG tablet TAKE 1 TABLET BY MOUTH TWICE A DAY FOR 3 DAYS  4       Allergies   Allergen Reactions     Ace Inhibitors Cough and Other (See Comments)     Cough       Review of Systems:  -Constitutional: The patient denies fatigue, fevers, chills, unintended weight loss, and night sweats.  -HEENT: Patient denies nonhealing oral sores.  -Skin: As above in HPI. No additional skin concerns.  -GI: no nausea, abdominal pain, vomiting, diarrhea or blood in the stool  -MSK: No arthralgias or myalgias    Physical exam:  Vitals: There were no vitals taken for this visit.  GEN: This is a well developed, well-nourished female in no acute distress, in a pleasant mood.    SKIN: Total skin excluding the undergarment areas was performed. The exam included the head/face, neck, both arms, chest, back, abdomen, both legs, digits and/or nails.   -Onycholysis of the distal finger nails  -Distal finger trips there is cracking, fissuring, erosion, and scale but this is improved since last visit  -On the bilateral planter feet there is numerous intact pustules that range in size from 1 mm to 4 mm. There is cracking fissuring, erosion and scale - but this is all thinner and improved since last visit  -Left great toe there is yellow thickening and oncolysis   -plaques on the bilateral ankles,  left shin, bilateral calfs, left groin, occipital  scalp are nearly resolved  -Improvement since the last visit, fewer open cracks and milder scaling  -No other lesions of concern on areas examined.       Impression/Plan:  1. Pustular Psoriasis of the palms and soles    Continue Stelara 90 mg/ml. Prescription refilled today. If patient still noticing breakthrough pustules about 2 weeks prior to her next injection will consider trying to get her approved for injections every 10 weeks.     Continue halobetasol 0.05% cream, apply topically BID, may continue with other topicals. Prescription refilled today.    Photograph was obtained for clinical monitoring and inclusion in medical record.    Safety labs: CBC and CMP ordered today, will repeat TB gold in 1 year    CC Dr. Pena on close of this encounter.  Follow-up in 6 months, earlier for new or changing lesions.       Staff Involved:  Staff/Scribe    Scribe Disclosure:  I, Osiel Bridges, am serving as a scribe to document services personally performed by Margarita Brandt PA-C, based on data collection and the provider's statements to me.     Provider Disclosure:   The documentation recorded by the scribe accurately reflects the services I personally performed and the decisions made by me.    All risks, benefits and alternatives were discussed with patient.  Patient is in agreement and understands the assessment and plan.  All questions were answered.    Margarita Brandt PA-C  [facility]: Phone: [phone], Fax: [phone]

## 2019-09-13 NOTE — NURSING NOTE
"Dermatology Rooming Note    Eulalia Mendez's goals for this visit include:   Chief Complaint   Patient presents with     Derm Problem     Eulalia is here today for a psoraisis follow up- Eulalia states \"it's better\".      Jordana Warner, MCKAYLA     "

## 2020-01-15 ENCOUNTER — DOCUMENTATION ONLY (OUTPATIENT)
Dept: CARE COORDINATION | Facility: CLINIC | Age: 55
End: 2020-01-15

## 2020-01-30 ENCOUNTER — OFFICE VISIT (OUTPATIENT)
Dept: DERMATOLOGY | Facility: CLINIC | Age: 55
End: 2020-01-30
Payer: COMMERCIAL

## 2020-01-30 DIAGNOSIS — Z79.899 ENCOUNTER FOR LONG-TERM (CURRENT) USE OF HIGH-RISK MEDICATION: ICD-10-CM

## 2020-01-30 DIAGNOSIS — L40.3 PUSTULAR PSORIASIS OF PALMS AND SOLES: Primary | ICD-10-CM

## 2020-01-30 RX ORDER — CRISABOROLE 20 MG/G
OINTMENT TOPICAL
Qty: 100 G | Refills: 1 | Status: SHIPPED | OUTPATIENT
Start: 2020-01-30 | End: 2022-03-17

## 2020-01-30 ASSESSMENT — PAIN SCALES - GENERAL: PAINLEVEL: MODERATE PAIN (5)

## 2020-01-30 NOTE — NURSING NOTE
"Dermatology Rooming Note    Eulalia Mendez's goals for this visit include:   Chief Complaint   Patient presents with     Derm Problem     Eulalia is here today for a psoriasis follow up- Natty states \"It's much better\".      Jordana Warner, MCKAYLA     "

## 2020-01-30 NOTE — PROGRESS NOTES
"[facility] Dermatology Note      Dermatology Problem List:  1. 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 Originally diagnosed at [place] [facility] in 2014.  -Photograph obtained 1/30/2020  -current tx: Stelara 90mg/ml Q12 weeks, halobetasol 0.05% ointment  -previous tx: Cosentyx - stopped due to numerous intolerable side effects (SOB, aphthous ulcers, thrush and itching), methotrexate (did not help), Humira (stopped working) , Enbrel (stopped working), various topical corticosteroids, calcipotriene  2. Hx of RA and hypothyroidism - rheum prefers her to be on biologic due to hx of RA     Encounter Date: Jan 30, 2020    CC:  Chief Complaint   Patient presents with     Derm Problem     Eulalia is here today for a psoriasis follow up- Natty states \"It's much better\".          History of Present Illness:  Ms. Eulalia Mendez is a 54 year old female who presents as a follow-up for psoriasis. The patient was last seen on 09/13/2019 when Stelara 90 mg/ml was continued and halobetasol 0.05% cream BID was continued for pustular psoriasis of the palms and soles. At today's visit, the patient notes her psoriasis has improved greatly since starting stelara. She can now walk, she is really happy with the progress. However, she notes that she has psoriasis flares 2-3 weeks before it is time to take another stelara dose. She notes that occassionally she gets stabbing pains in her hands and feet as well as numbness of the hands and feet. The patient notes she experiences nausea and headaches as well but is able to tolerate this, and it does not bother her as much as having pustular PSO on the hands/feet. She denies additional lesions or areas of concern. Has not been recently sick or hospitalized. The patient denies painful, itching, tingling or bleeding lesions unless otherwise noted.    Past Medical History:   There is no problem " list on file for this patient.    No past medical history on file.  No past surgical history on file.    Social History:   reports that she has been smoking. She has never used smokeless tobacco.    Family History:  Family History   Problem Relation Age of Onset     Melanoma No family hx of      Skin Cancer No family hx of        Medications:  Current Outpatient Medications   Medication Sig Dispense Refill     atorvastatin (LIPITOR) 40 MG tablet Take 40 mg by mouth       baclofen (LIORESAL) 10 MG tablet Take 10 mg by mouth       betamethasone dipropionate (DIPROSONE) 0.05 % external cream        calcipotriene (DOVONOX) 0.005 % external cream APPLY TO AFFECTED AREA TWICE A DAY       clobetasol (TEMOVATE) 0.05 % external ointment APPLY TO FEET TWICE DAILY X 4 DAYS PER WEEK  3     clonazePAM (KLONOPIN) 1 MG tablet Take 1 mg by mouth       esomeprazole (NEXIUM) 40 MG DR capsule Take 40 mg by mouth       EUCRISA 2 % ointment APPLY TO AFFECTED AREA TWICE A DAY  1     fluconazole (DIFLUCAN) 100 MG tablet TAKE 2 TABLETS BY MOUTH ON DAY 1, THEN TAKE 1 TABLET DAILY FOR 6 DAYS.  0     flunisolide (NASALIDE) 25 MCG/ACT (0.025%) SOLN spray Spray 2 sprays in nostril       flunisolide (NASALIDE) 25 MCG/ACT (0.025%) SOLN spray INHALE 2 SPRAYS INTO BOTH NOSTRILS 2 TIMES DAILY.  6     fluorometholone (FML LIQUIFILM) 0.1 % ophthalmic suspension I drop 3 times a day for 1 week then once a day for 1 week then STOP       halobetasol (ULTRAVATE) 0.05 % external cream Apply topically 2 times daily 50 g 3     HYDROcodone-acetaminophen (NORCO) 5-325 MG tablet Take 1-2 tablets by mouth       hydrOXYzine (ATARAX) 25 MG tablet TAKE 1 TABLET BY MOUTH EVERY 8 HOURS IF NEEDED (BEST AT BEDTIME).  3     hydrOXYzine (VISTARIL) 25 MG capsule Take 25 mg by mouth       levothyroxine (SYNTHROID/LEVOTHROID) 175 MCG tablet Take 175 mcg by mouth       losartan (COZAAR) 50 MG tablet Take 50 mg by mouth       loteprednol (LOTEMAX) 0.5 % ophthalmic suspension  Use 1 drop in both eyes 3 times a day for 1 week then once a day for 1 week then STOP       metoprolol succinate ER (TOPROL-XL) 100 MG 24 hr tablet Take 100 mg by mouth daily  3     ondansetron (ZOFRAN-ODT) 4 MG ODT tab Place 4 mg under the tongue       ranitidine (ZANTAC) 150 MG tablet TAKE 1 TABLET BY MOUTH TWICE A DAY  3     sertraline (ZOLOFT) 50 MG tablet        sucralfate (CARAFATE) 1 GM tablet Take 1 g by mouth       triamcinolone (KENALOG) 0.1 % external ointment Apply topically 2 times daily  0     ustekinumab (STELARA) 90 MG/ML Inject 1 mL (90 mg) Subcutaneous every 3 months 1 mL 3     ustekinumab (STELARA) 90 MG/ML Inject 90 mg Subcutaneous       valACYclovir (VALTREX) 500 MG tablet TAKE 1 TABLET BY MOUTH TWICE A DAY FOR 3 DAYS  4       Allergies   Allergen Reactions     Ace Inhibitors Cough and Other (See Comments)     Cough       Review of Systems:  -Constitutional: The patient denies fatigue, fevers, chills, unintended weight loss, and night sweats.  -HEENT: Patient denies nonhealing oral sores.  -Skin: As above in HPI. No additional skin concerns.    Physical exam:  Vitals: There were no vitals taken for this visit.  GEN: This is a well developed, well-nourished female in no acute distress, in a pleasant mood.    SKIN: Focused examination of the face, neck, bilateral hands and bilateral feet was performed.  -Across the fingernail: 5 mm of normal nail growth with pitting distally  -Mild scaling of the bilateral palms, especially at the fingertips, but vastly improved otherwise  -Mild scaling of the bilateral feet, but vastly improve otherwise  -No other lesions of concern on areas examined.       Impression/Plan:  1. Pustular Psoriasis of he palms and soles - well controlled on stelara    Continue Stelara 90 mg/ml. Will attempt to increase frequency of injections every 8 weeks as patient is experiencing flares 2-3 weeks before her injections, if not approved for this we will continue with injections  Q12 weeks    Continue halobetasol 0.05% cream, apply topically BID, may continue with other topicals if needed as well.    Continue Eucrisa 2% ointment, apply to affected area twice daily. Prescription refilled today.     Photograph was obtained for clinical monitoring and inclusion in medical record.     Will obtain labs at the next visit, she will be due for TB test and CBC at that time    Patient's insurance recently changed and she would like to go to annual visits after her next visit if possible    CC Dr. Pena on close of this encounter.  Follow-up in 6 months, earlier for new or changing lesions.       Staff Involved:  Staff/Scribe    Scribe Disclosure:  I, Osiel Bridges, am serving as a scribe to document services personally performed by Margarita Brandt PA-C, based on data collection and the provider's statements to me.     Provider Disclosure:   The documentation recorded by the scribe accurately reflects the services I personally performed and the decisions made by me.    All risks, benefits and alternatives were discussed with patient.  Patient is in agreement and understands the assessment and plan.  All questions were answered.    Margarita Brandt PA-C, MPAS  [facility] [place]: Phone: [phone], Fax: [phone]  [facility]: Phone: [phone],  Fax: [phone]

## 2020-01-30 NOTE — LETTER
"1/30/2020       RE: Eulalia Mendez  [address] [facility] [address]     Dear Colleague,    Thank you for referring your patient, Eulalia Mendez, to the [facility] DERMATOLOGY at [facility]. Please see a copy of my visit note below.    [facility] Dermatology Note      Dermatology Problem List:  1. 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 Originally diagnosed at [place] in 2014.  -Photograph obtained 1/30/2020  -current tx: Stelara 90mg/ml  Q12 weeks, halobetasol 0.05% ointment  -previous tx: Cosentyx - stopped due to numerous intolerable side effects (SOB, aphthous ulcers, thrush and itching), methotrexate (did not help), Humira (stopped working) , Enbrel (stopped working), various topical corticosteroids, calcipotriene  2. Hx of RA and hypothyroidism - rheum prefers her to be on biologic due to hx of RA     Encounter Date: Jan 30, 2020    CC:  Chief Complaint   Patient presents with     Derm Problem     Eulalia is here today for a psoriasis follow up- Natty states \"It's much better\".          History of Present Illness:  Ms. Eulalia Mendez is a 54 year old female who presents as a follow-up for psoriasis. The patient was last seen on 09/13/2019 when Stelara 90 mg/ml was continued and halobetasol 0.05% cream BID was continued for pustular psoriasis of the palms and soles. At today's visit, the patient notes her psoriasis has improved greatly since starting stelara. She can now walk, she is really happy with the progress. However, she notes that she has psoriasis flares 2-3 weeks before it is time to take another stelara dose. She notes that occassionally she gets stabbing pains in her hands and feet as well as numbness of the hands and feet. The patient notes she experiences nausea and headaches as well but is able to tolerate this, and it does not bother her as " much as having pustular PSO on the hands/feet. She denies additional lesions or areas of concern. Has not been recently sick or hospitalized. The patient denies painful, itching, tingling or bleeding lesions unless otherwise noted.    Past Medical History:   There is no problem list on file for this patient.    No past medical history on file.  No past surgical history on file.    Social History:   reports that she has been smoking. She has never used smokeless tobacco.    Family History:  Family History   Problem Relation Age of Onset     Melanoma No family hx of      Skin Cancer No family hx of        Medications:  Current Outpatient Medications   Medication Sig Dispense Refill     atorvastatin (LIPITOR) 40 MG tablet Take 40 mg by mouth       baclofen (LIORESAL) 10 MG tablet Take 10 mg by mouth       betamethasone dipropionate (DIPROSONE) 0.05 % external cream        calcipotriene (DOVONOX) 0.005 % external cream APPLY TO AFFECTED AREA TWICE A DAY       clobetasol (TEMOVATE) 0.05 % external ointment APPLY TO FEET TWICE DAILY X 4 DAYS PER WEEK  3     clonazePAM (KLONOPIN) 1 MG tablet Take 1 mg by mouth       esomeprazole (NEXIUM) 40 MG DR capsule Take 40 mg by mouth       EUCRISA 2 % ointment APPLY TO AFFECTED AREA TWICE A DAY  1     fluconazole (DIFLUCAN) 100 MG tablet TAKE 2 TABLETS BY MOUTH ON DAY 1, THEN TAKE 1 TABLET DAILY FOR 6 DAYS.  0     flunisolide (NASALIDE) 25 MCG/ACT (0.025%) SOLN spray Spray 2 sprays in nostril       flunisolide (NASALIDE) 25 MCG/ACT (0.025%) SOLN spray INHALE 2 SPRAYS INTO BOTH NOSTRILS 2 TIMES DAILY.  6     fluorometholone (FML LIQUIFILM) 0.1 % ophthalmic suspension I drop 3 times a day for 1 week then once a day for 1 week then STOP       halobetasol (ULTRAVATE) 0.05 % external cream Apply topically 2 times daily 50 g 3     HYDROcodone-acetaminophen (NORCO) 5-325 MG tablet Take 1-2 tablets by mouth       hydrOXYzine (ATARAX) 25 MG tablet TAKE 1 TABLET BY MOUTH EVERY 8 HOURS IF  NEEDED (BEST AT BEDTIME).  3     hydrOXYzine (VISTARIL) 25 MG capsule Take 25 mg by mouth       levothyroxine (SYNTHROID/LEVOTHROID) 175 MCG tablet Take 175 mcg by mouth       losartan (COZAAR) 50 MG tablet Take 50 mg by mouth       loteprednol (LOTEMAX) 0.5 % ophthalmic suspension Use 1 drop in both eyes 3 times a day for 1 week then once a day for 1 week then STOP       metoprolol succinate ER (TOPROL-XL) 100 MG 24 hr tablet Take 100 mg by mouth daily  3     ondansetron (ZOFRAN-ODT) 4 MG ODT tab Place 4 mg under the tongue       ranitidine (ZANTAC) 150 MG tablet TAKE 1 TABLET BY MOUTH TWICE A DAY  3     sertraline (ZOLOFT) 50 MG tablet        sucralfate (CARAFATE) 1 GM tablet Take 1 g by mouth       triamcinolone (KENALOG) 0.1 % external ointment Apply topically 2 times daily  0     ustekinumab (STELARA) 90 MG/ML Inject 1 mL (90 mg) Subcutaneous every 3 months 1 mL 3     ustekinumab (STELARA) 90 MG/ML Inject 90 mg Subcutaneous       valACYclovir (VALTREX) 500 MG tablet TAKE 1 TABLET BY MOUTH TWICE A DAY FOR 3 DAYS  4       Allergies   Allergen Reactions     Ace Inhibitors Cough and Other (See Comments)     Cough       Review of Systems:  -Constitutional: The patient denies fatigue, fevers, chills, unintended weight loss, and night sweats.  -HEENT: Patient denies nonhealing oral sores.  -Skin: As above in HPI. No additional skin concerns.    Physical exam:  Vitals: There were no vitals taken for this visit.  GEN: This is a well developed, well-nourished female in no acute distress, in a pleasant mood.    SKIN: Focused examination of the face, neck, bilateral hands and bilateral feet was performed.  -Across the fingernail: 5 mm of normal nail growth with pitting distally  -Mild scaling of the bilateral palms, especially at the fingertips, but vastly improved otherwise  -Mild scaling of the bilateral feet, but vastly improve otherwise  -No other lesions of concern on areas examined.        Impression/Plan:  1. Pustular Psoriasis of he palms and soles - well controlled on stelara    Continue Stelara 90 mg/ml. Will attempt to increase frequency of injections every 8 weeks as patient is experiencing flares 2-3 weeks before her injections, if not approved for this we will continue with injections Q12 weeks    Continue halobetasol 0.05% cream, apply topically BID, may continue with other topicals if needed as well.    Continue Eucrisa 2% ointment, apply to affected area twice daily. Prescription refilled today.     Photograph was obtained for clinical monitoring and inclusion in medical record.     Will obtain labs at the next visit, she will be due for TB test and CBC at that time    Patient's insurance recently changed and she would like to go to annual visits after her next visit if possible    CC Dr. Pena on close of this encounter.  Follow-up in 6 months, earlier for new or changing lesions.       Staff Involved:  Staff/Scribe    Scribe Disclosure:  I, Osiel Bridges, am serving as a scribe to document services personally performed by Margarita Brandt PA-C, based on data collection and the provider's statements to me.     Provider Disclosure:   The documentation recorded by the scribe accurately reflects the services I personally performed and the decisions made by me.    All risks, benefits and alternatives were discussed with patient.  Patient is in agreement and understands the assessment and plan.  All questions were answered.    Margarita Brandt PA-C, MPAS  [facility] [place]: Phone: [phone], Fax: [phone]  [facility]: Phone: [phone],  Fax: [phone]

## 2020-01-31 ENCOUNTER — TELEPHONE (OUTPATIENT)
Dept: DERMATOLOGY | Facility: CLINIC | Age: 55
End: 2020-01-31

## 2020-01-31 NOTE — TELEPHONE ENCOUNTER
PA Initiation    Medication: Stelara (every 8 weeks)- PENDING   Insurance Company: [employer] [facility] - Phone [phone] Fax [phone]  Pharmacy Filling the Rx: [facility] - [place], [place] - [address]  Filling Pharmacy Phone:    Filling Pharmacy Fax:    Start Date: 1/31/2020

## 2020-02-04 NOTE — TELEPHONE ENCOUNTER
[facility], had to call pt to get her weight since this is not on file (170 lbs). Faxed in addnl information.

## 2020-02-06 NOTE — TELEPHONE ENCOUNTER
PRIOR AUTHORIZATION DENIED    Medication: Stelara (every 8 weeks)- [place] approved    Denial Date:  2/6/2020    Denial Rational: Dosing regiment exceeds FDA approved dosing regimen    Appeal Information: Fax appeals to # on file

## 2020-02-17 NOTE — TELEPHONE ENCOUNTER
Medication Appeal Initiation    We have initiated an appeal for the requested medication:  Medication: Stelara (every 8 weeks)- APPEAL INITIATED   Appeal Start Date:  2/17/2020  Insurance Company:    Comments:

## 2020-02-28 NOTE — TELEPHONE ENCOUNTER
Called pharmacy to check status of appeal - spoke to Hina - she gave me the appeals number [phone] - I had to leave a voice mail

## 2020-03-02 ENCOUNTER — TELEPHONE (OUTPATIENT)
Dept: DERMATOLOGY | Facility: CLINIC | Age: 55
End: 2020-03-02

## 2020-03-02 ENCOUNTER — HEALTH MAINTENANCE LETTER (OUTPATIENT)
Age: 55
End: 2020-03-02

## 2020-03-02 NOTE — TELEPHONE ENCOUNTER
[facility] Call Center    Phone Message    May a detailed message be left on voicemail: yes     Reason for Call: Other: Nasir from [facility] Prior Auth Appeals team is requesting a call back to discuss possible other medications for the Pt. Dimas Banks Otezla and Sophia.  Please follow up with clarification at [phone]     Action Taken: Message routed to:  [facility] PCC Derm    Travel Screening: Not Applicable

## 2020-03-10 ENCOUNTER — TELEPHONE (OUTPATIENT)
Dept: DERMATOLOGY | Facility: CLINIC | Age: 55
End: 2020-03-10

## 2020-03-10 NOTE — TELEPHONE ENCOUNTER
[facility] Call [facility]    Phone Message    May a detailed message be left on voicemail: no     Reason for Call: Arben called from [facility] regarding STELARA, asking if this medication was prescribed for rheumatoid arthritis? Arben is requesting a call back from Rikki. Please call Arben back at .     Action Taken: Message routed to:  [facility] derm    Travel Screening: Not Applicable

## 2020-03-13 NOTE — TELEPHONE ENCOUNTER
Voicemail left with Arben informing her the medication is used for treatment per note below.    Margarita Brandt, Jordana Cao, CMA 2 days ago       I called this person back from [facility] but was unable to get a hold of them and left a voice message instead. She can call us back if more information is needed. The answer to her question is that yes this patient does have RA and yes her rheumatology team would prefer her to be on a biologic, specifically Stelara. Just FYI if they call back. Thanks     Margarita

## 2020-03-13 NOTE — TELEPHONE ENCOUNTER
[facility] Call Center    Phone Message    May a detailed message be left on voicemail: yes     Reason for Call: Other: Arben called from [facility] needs to know If Stelashasha, she needs to know If this Medication is being used to traeat this (Yes/NO) ? [phone] Arben     Action Taken: Message routed to:  [facility] derm    Travel Screening: Not Applicable

## 2020-04-27 ENCOUNTER — TELEPHONE (OUTPATIENT)
Dept: DERMATOLOGY | Facility: CLINIC | Age: 55
End: 2020-04-27

## 2020-04-27 NOTE — TELEPHONE ENCOUNTER
Received fax from [facility] asking for a PA request.  I know we've been waiting for an appeal, unable to get a hold of  Arben to verify or confirm where the appeal stands.     But I've started a new PA request per this letter:

## 2020-04-29 NOTE — TELEPHONE ENCOUNTER
Prior Authorization Approval    Authorization Effective Date: 1/30/2020  Authorization Expiration Date: 1/31/2021  Medication: Pina ALEXANDER APPROVED  Approved Dose/Quantity: 90mg/1  Reference #:     Insurance Company: [employer] - Phone [phone] Fax [phone]  Expected CoPay:       CoPay Card Available:      Foundation Assistance Needed:    Which Pharmacy is filling the prescription (Not needed for infusion/clinic administered): [facility] [place]  Pharmacy Notified:    Patient Notified:        Approval Letter:

## 2020-05-06 ENCOUNTER — NURSE TRIAGE (OUTPATIENT)
Dept: NURSING | Facility: CLINIC | Age: 55
End: 2020-05-06

## 2020-05-06 DIAGNOSIS — L40.3 PUSTULAR PSORIASIS OF PALMS AND SOLES: ICD-10-CM

## 2020-05-06 RX ORDER — USTEKINUMAB 90 MG/ML
90 INJECTION, SOLUTION SUBCUTANEOUS
Qty: 1 ML | Refills: 3 | Status: SHIPPED | OUTPATIENT
Start: 2020-05-06 | End: 2021-03-19

## 2020-05-06 NOTE — TELEPHONE ENCOUNTER
"[facility] specialty pharmacy in [place], [place] called asking for a Rx for Stelara for every three months.   A Rx for this was sent in just prior to pharmacist calling.    They hadn't received it yet.  Will call back if they don't get it within the next hour.  Mamie SALCEDO RN [facility]       Reason for Disposition    Pharmacy calling with prescription question and triager answers question    Additional Information    Negative: MORE THAN A DOUBLE DOSE of a prescription or over-the-counter (OTC) drug    Negative: [1] DOUBLE DOSE (an extra dose or lesser amount) of over-the-counter (OTC) drug AND [2] any symptoms (e.g., dizziness, nausea, pain, sleepiness)    Negative: [1] DOUBLE DOSE (an extra dose or lesser amount) of prescription drug AND [2] any symptoms (e.g., dizziness, nausea, pain, sleepiness)    Negative: Took another person's prescription drug    Negative: [1] DOUBLE DOSE (an extra dose or lesser amount) of prescription drug AND [2] NO symptoms (Exception: a double dose of antibiotics)    Negative: Diabetes drug error or overdose (e.g., insulin or extra dose)    Negative: [1] Request for URGENT new prescription or refill of \"essential\" medication (i.e., likelihood of harm to patient if not taken) AND [2] triager unable to fill per unit policy    Negative: [1] Prescription not at pharmacy AND [2] was prescribed today by PCP    Negative: Pharmacy calling with prescription questions and triager unable to answer question    Negative: Caller has URGENT medication question about med that PCP prescribed and triager unable to answer question    Negative: Caller has NON-URGENT medication question about med that PCP prescribed and triager unable to answer question    Negative: Caller requesting a NON-URGENT new prescription or refill and triager unable to refill per unit policy    Negative: Caller has medication question about med not prescribed by PCP and triager unable to answer question (e.g., compatibility with " other med, storage)    Protocols used: MEDICATION QUESTION CALL-A-AH

## 2020-05-07 ENCOUNTER — TELEPHONE (OUTPATIENT)
Dept: DERMATOLOGY | Facility: CLINIC | Age: 55
End: 2020-05-07

## 2020-05-07 DIAGNOSIS — L40.3 PUSTULAR PSORIASIS OF PALMS AND SOLES: Primary | ICD-10-CM

## 2020-05-07 RX ORDER — TACROLIMUS 1 MG/G
OINTMENT TOPICAL 2 TIMES DAILY
Qty: 60 G | Refills: 3 | Status: SHIPPED | OUTPATIENT
Start: 2020-05-07 | End: 2024-03-15

## 2020-05-07 NOTE — TELEPHONE ENCOUNTER
Called Eulalia and left a detailed voice message. I let her know Margarita's message below. Clinic number provided in case of any questions or concerns.  MCKAYLA Harkins, Margarita FOOTE PA-C  You 50 minutes ago (12:33 PM)       Will ou call her and let her know that I sent over Protopic as an alternative to Eucrisa. It may be covered a bit better. Thanks!    Message text

## 2020-12-20 ENCOUNTER — HEALTH MAINTENANCE LETTER (OUTPATIENT)
Age: 55
End: 2020-12-20

## 2021-01-26 ENCOUNTER — TELEPHONE (OUTPATIENT)
Dept: DERMATOLOGY | Facility: CLINIC | Age: 56
End: 2021-01-26

## 2021-01-26 NOTE — TELEPHONE ENCOUNTER
PA Initiation    Medication: Stelara - Pending  Insurance Company: [employer] - Phone [phone] Fax [phone]  Pharmacy Filling the Rx:    Filling Pharmacy Phone:    Filling Pharmacy Fax:    Start Date: 1/26/2021

## 2021-02-15 NOTE — TELEPHONE ENCOUNTER
Prior Authorization Approval    Authorization Effective Date: 2/2/2021  Authorization Expiration Date: 2/2/2022  Medication: Stelara - Approved  Approved Dose/Quantity:   Reference #: [id number]   Insurance Company: [employer] - Phone [phone] Fax [phone]  Expected CoPay:       CoPay Card Available:      Foundation Assistance Needed:    Which Pharmacy is filling the prescription (Not needed for infusion/clinic administered): [facility] [place]  Pharmacy Notified:    Patient Notified:

## 2021-03-16 DIAGNOSIS — L40.3 PUSTULAR PSORIASIS OF PALMS AND SOLES: ICD-10-CM

## 2021-03-19 RX ORDER — USTEKINUMAB 90 MG/ML
90 INJECTION, SOLUTION SUBCUTANEOUS
Qty: 1 ML | Refills: 0 | Status: SHIPPED | OUTPATIENT
Start: 2021-03-19 | End: 2021-09-09

## 2021-03-19 NOTE — TELEPHONE ENCOUNTER
JOSE M PFS 90MG/1ML  Last Written Prescription Date:  5/6/2020  Last Fill Quantity: 1,   # refills: 3  Last Office Visit : 5/6/2020  Future Office visit:  None    Routing refill request to provider for review/approval because:  Drug not on the [facility], [facility] or [facility] refill protocol or controlled substance      Anya Wong RN  Central Triage Red Flags/Med Refills

## 2021-03-30 DIAGNOSIS — L40.3 PUSTULAR PSORIASIS OF PALMS AND SOLES: ICD-10-CM

## 2021-04-02 RX ORDER — USTEKINUMAB 90 MG/ML
INJECTION, SOLUTION SUBCUTANEOUS
Refills: 2 | OUTPATIENT
Start: 2021-04-02

## 2021-04-02 NOTE — TELEPHONE ENCOUNTER
STELARA PFS 90MG/1ML      INJECT 1 SYRINGE SUBCUTANEOUSLY EVERY 3 MONTHS.  Last Written Prescription Date:  3-19-21  Last Fill Quantity: 1,   # refills: 0  REFUSED : Request to soon

## 2021-04-24 ENCOUNTER — HEALTH MAINTENANCE LETTER (OUTPATIENT)
Age: 56
End: 2021-04-24

## 2021-09-06 DIAGNOSIS — L40.3 PUSTULAR PSORIASIS OF PALMS AND SOLES: ICD-10-CM

## 2021-09-09 NOTE — TELEPHONE ENCOUNTER
JOSE M PFS 90MG/1ML      Last Written Prescription Date:  3-19-21  Last Fill Quantity: 1 ml,   # refills: 0  Last Office Visit : 1-30-20 (RTC 6 M)  Future Office visit:  none    Routing refill request to provider for review/approval because:  Med not on Derm protocol  ? RTC, Provider out til

## 2021-09-21 NOTE — TELEPHONE ENCOUNTER
[facility] Call Center    Phone Message    May a detailed message be left on voicemail: yes     Reason for Call: Medication Refill Request    Has the patient contacted the pharmacy for the refill? Yes   Name of medication being requested: ustekinumab (STELARA) 90 MG/ML  Provider who prescribed the medication: Margarita Brandt  Pharmacy: [facility] [place]  [facility] [address] [facility]  [place]  [phone]  Date medication is needed: ASAP         Action Taken: Message routed to:  [facility] Derm    Travel Screening: Not Applicable

## 2021-09-22 RX ORDER — USTEKINUMAB 90 MG/ML
90 INJECTION, SOLUTION SUBCUTANEOUS
Qty: 1 ML | Refills: 0 | Status: SHIPPED | OUTPATIENT
Start: 2021-09-22 | End: 2022-02-23

## 2021-09-22 NOTE — TELEPHONE ENCOUNTER
Received refill request for stelara as the resident on call. Patient of Margarita Brandt. Reviewed patient's chart and attached communication. Patient last seen 01/2020 for pustular psoriasis. RTC 6mo delayed 2/2 shelter in place w/ COVID19 per chart review. Given severe disease with good control, will provide fill at this time to assure follow-up.  Routing to clinic coordinator pool to assur follow-up.    After reviewing the medication list and assessment and plan from last visit, the refill request was accepted.    CC'ing Dr. Pena and Margarita Brandt as CAROLYN Paez MD  Internal Medicine - Dermatology PGY 4  [phone]

## 2021-10-03 ENCOUNTER — HEALTH MAINTENANCE LETTER (OUTPATIENT)
Age: 56
End: 2021-10-03

## 2022-02-02 ENCOUNTER — TELEPHONE (OUTPATIENT)
Dept: DERMATOLOGY | Facility: CLINIC | Age: 57
End: 2022-02-02
Payer: COMMERCIAL

## 2022-02-02 NOTE — TELEPHONE ENCOUNTER
PA Initiation    Medication: Stelara  Insurance Company: [employer] - Phone [phone] Fax [phone]  ID#: [id number]  [facility] [place]  Filling Pharmacy Phone:    Filling Pharmacy Fax:    Start Date: 2/2/2022    [facility] Key [id number]

## 2022-02-07 NOTE — TELEPHONE ENCOUNTER
Prior Authorization Approval    Authorization Effective Date: 2/5/2022  Authorization Expiration Date: 2/5/2024  Medication: Stelara  Approved Dose/Quantity:   Reference #: CMM Key [id number]   Insurance Company: [employer] - Phone [phone] Fax [phone]  Expected CoPay:  $4144.97     CoPay Card Available:      Foundation Assistance Needed:    Which Pharmacy is filling the prescription (Not needed for infusion/clinic administered): [facility] [place]  Pharmacy Notified:  yes  Patient Notified:  yes

## 2022-02-21 DIAGNOSIS — L40.3 PUSTULAR PSORIASIS OF PALMS AND SOLES: ICD-10-CM

## 2022-02-23 RX ORDER — USTEKINUMAB 90 MG/ML
90 INJECTION, SOLUTION SUBCUTANEOUS
Qty: 1 ML | Refills: 0 | Status: SHIPPED | OUTPATIENT
Start: 2022-02-23 | End: 2022-03-17

## 2022-02-23 NOTE — TELEPHONE ENCOUNTER
STELARA PFS 90MG/1ML  Last Written Prescription Date:  9/22/21  Last Fill Quantity: 1 ml ,   # refills: 0  Last Office Visit : 5/6/2020  Future Office visit:  5/20/22 Rikki    Routing refill request to provider for review/approval because:  Drug not on the [facility] refill protocol.  Last visit 5/6/20, upcoming 5/20/22

## 2022-03-17 ENCOUNTER — OFFICE VISIT (OUTPATIENT)
Dept: DERMATOLOGY | Facility: CLINIC | Age: 57
End: 2022-03-17
Payer: COMMERCIAL

## 2022-03-17 DIAGNOSIS — D22.9 BENIGN NEVUS OF SKIN: ICD-10-CM

## 2022-03-17 DIAGNOSIS — L40.3 PUSTULAR PSORIASIS OF PALMS AND SOLES: Primary | ICD-10-CM

## 2022-03-17 DIAGNOSIS — L82.1 SEBORRHEIC KERATOSES: ICD-10-CM

## 2022-03-17 PROCEDURE — 99214 OFFICE O/P EST MOD 30 MIN: CPT | Performed by: PHYSICIAN ASSISTANT

## 2022-03-17 RX ORDER — USTEKINUMAB 90 MG/ML
90 INJECTION, SOLUTION SUBCUTANEOUS
Qty: 1 ML | Refills: 3 | Status: SHIPPED | OUTPATIENT
Start: 2022-03-17 | End: 2022-08-18

## 2022-03-17 RX ORDER — BETAMETHASONE DIPROPIONATE 0.5 MG/G
CREAM TOPICAL DAILY
Qty: 56 G | Refills: 3 | Status: SHIPPED | OUTPATIENT
Start: 2022-03-17 | End: 2022-08-18

## 2022-03-17 RX ORDER — CLOBETASOL PROPIONATE 0.5 MG/G
OINTMENT TOPICAL
Qty: 60 G | Refills: 3 | Status: SHIPPED | OUTPATIENT
Start: 2022-03-17 | End: 2022-08-18

## 2022-03-17 ASSESSMENT — PAIN SCALES - GENERAL: PAINLEVEL: EXTREME PAIN (8)

## 2022-03-17 NOTE — NURSING NOTE
Dermatology Rooming Note    Eulalia Mendez's goals for this visit include:   Chief Complaint   Patient presents with     Psoriasis     pustular psoriasis, also concerned about bumps on chest and hand      Sonya Johnson, Visit Facilitator

## 2022-03-17 NOTE — LETTER
3/17/2022       RE: Eulalia Mendez  [address] [facility] [address]     Dear Colleague,    Thank you for referring your patient, Eulalia Mendez, to the [facility] DERMATOLOGY CLINIC [place] at [facility]. Please see a copy of my visit note below.    [facility] Dermatology Note  Encounter Date: Mar 17, 2022  Office Visit     Dermatology Problem List:  1. 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 Originally diagnosed at [place] in 2014.  - Photograph obtained 1/30/2020  - Current tx: Stelara 90mg/ml Q12 weeks, clobetasol 0.05% ointment, betamethasone 0.05% cream  - Previous tx: Cosentyx - stopped due to numerous intolerable side effects (SOB, aphthous ulcers, thrush and itching), methotrexate (did not help), Humira (stopped working) , Enbrel (stopped working), various topical corticosteroids, calcipotriene, halobetasol, Eucrisa (helpful but too expensive)  -Safety labs: TB Gold 2/9/22, CBC/CMP 9/15/21  2. Hx of RA and hypothyroidism - rheum prefers her to be on biologic due to hx of RA   ____________________________________________    Assessment & Plan:    # Pustular psoriasis of he palms and soles - chronic problem, well controlled on stelara  - Continue Stelara 90 mg/ml every 3 months, refilled today  - Continue betamethasone 0.05% cream daily prn  - Continue clobetasol 0.05% ointment BID 4 days/week  - PCP with check CMP and CBC within the next month-TB Gold Negative, reviewed with patient  - Recommended FBSE within the next year due to immunosuppression.    Procedures Performed:   None    Follow-up: 1 year(s) in-person, or earlier for new or changing lesions    Staff and Scribe:     Scribe Disclosure:  WILFREDO, Marv Falcon, am serving as a scribe to document services personally performed by Margarita Brandt PA-C based on data collection  and the provider's statements to me.   Provider Disclosure:   The documentation recorded by the scribe accurately reflects the services I personally performed and the decisions made by me.    All risks, benefits and alternatives were discussed with patient.  Patient is in agreement and understands the assessment and plan.  All questions were answered.    Margarita Brandt PA-C, MPAS  [facility]: Phone: [phone], Fax: [phone]  [facility]: Phone: [phone],  Fax: [phone]  [facility]: Phone: [phone], Fax: [phone]  ____________________________________________    CC: Psoriasis (pustular psoriasis, also concerned about bumps on chest and hand )    HPI:  Ms. Eulalia Mendez is a(n) 56 year old female who presents today as a return patient for follow-up. Last seen by me on 1/30/2020, at which time patient was increased on Stelara to injections every 8 weeks for treatment of pustular psoriasis. Her Stelara was not appoved for 8 weeks, and therefore she has been injecting it at home every 12 weeks. This is generally working well for her.     Today, patient reports that she has a resolving flare of her psoriasis on her hands and feet, but she feels that her psoriasis has been overall well controlled on Stelara.    Patient is otherwise feeling well, without additional skin concerns.    Labs Reviewed:  TB gold, Hemoglobin from 2/9/22 and CMP and CBC from 9/15/21  Patient plans to repeat CC/CMP in 1mo with PCP    Physical Exam:  Vitals: There were no vitals taken for this visit.  SKIN: Focused examination of hands and mid chest was performed.   - Distal finger tips bilaterally with some mild to moderate scaling.  - There is a waxy stuck on tan to brown papule on the right dorsal hand.  - Mid chest, pink flesh 4 mm papule.  - No other lesions of concern on areas examined.     Medications:  Current  Outpatient Medications   Medication     atorvastatin (LIPITOR) 40 MG tablet     baclofen (LIORESAL) 10 MG tablet     betamethasone dipropionate (DIPROSONE) 0.05 % external cream     clobetasol (TEMOVATE) 0.05 % external ointment     HYDROcodone-acetaminophen (NORCO) 5-325 MG tablet     hydrOXYzine (VISTARIL) 25 MG capsule     levothyroxine (SYNTHROID/LEVOTHROID) 175 MCG tablet     losartan (COZAAR) 50 MG tablet     metoprolol succinate ER (TOPROL-XL) 100 MG 24 hr tablet     sertraline (ZOLOFT) 50 MG tablet     sucralfate (CARAFATE) 1 GM tablet     ustekinumab (STELARA) 90 MG/ML     calcipotriene (DOVONOX) 0.005 % external cream     clonazePAM (KLONOPIN) 1 MG tablet     esomeprazole (NEXIUM) 40 MG DR capsule     EUCRISA 2 % ointment     fluconazole (DIFLUCAN) 100 MG tablet     flunisolide (NASALIDE) 25 MCG/ACT (0.025%) SOLN spray     flunisolide (NASALIDE) 25 MCG/ACT (0.025%) SOLN spray     fluorometholone (FML LIQUIFILM) 0.1 % ophthalmic suspension     halobetasol (ULTRAVATE) 0.05 % external cream     hydrOXYzine (ATARAX) 25 MG tablet     loteprednol (LOTEMAX) 0.5 % ophthalmic suspension     ondansetron (ZOFRAN-ODT) 4 MG ODT tab     ranitidine (ZANTAC) 150 MG tablet     tacrolimus (PROTOPIC) 0.1 % external ointment     triamcinolone (KENALOG) 0.1 % external ointment     ustekinumab (STELARA) 90 MG/ML     valACYclovir (VALTREX) 500 MG tablet     No current facility-administered medications for this visit.      Past Medical History:   Patient Active Problem List   Diagnosis     Psoriasis     Lateral Epicondylitis     Trochanteric Bursitis     Limb Pain     No past medical history on file.     CC Cindy Andre  [facility]  [address] [place] [address] on close of this encounter.

## 2022-03-17 NOTE — PROGRESS NOTES
[facility] Dermatology Note  Encounter Date: Mar 17, 2022  Office Visit     Dermatology Problem List:  1. 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 Originally diagnosed at [place] in 2014.  - Photograph obtained 1/30/2020  - Current tx: Stelara 90mg/ml Q12 weeks, clobetasol 0.05% ointment, betamethasone 0.05% cream  - Previous tx: Cosentyx - stopped due to numerous intolerable side effects (SOB, aphthous ulcers, thrush and itching), methotrexate (did not help), Humira (stopped working) , Enbrel (stopped working), various topical corticosteroids, calcipotriene, halobetasol, Eucrisa (helpful but too expensive)  -Safety labs: TB Gold 2/9/22, CBC/CMP 9/15/21  2. Hx of RA and hypothyroidism - rheum prefers her to be on biologic due to hx of RA   ____________________________________________    Assessment & Plan:    # Pustular psoriasis of he palms and soles - chronic problem, well controlled on stelara  - Continue Stelara 90 mg/ml every 3 months, refilled today  - Continue betamethasone 0.05% cream daily prn  - Continue clobetasol 0.05% ointment BID 4 days/week  - PCP with check CMP and CBC within the next month-TB Gold Negative, reviewed with patient  - Recommended FBSE within the next year due to immunosuppression.    Procedures Performed:   None    Follow-up: 1 year(s) in-person, or earlier for new or changing lesions    Staff and Scribe:     Scribe Disclosure:  I, Marv Falcon, am serving as a scribe to document services personally performed by Margarita Brandt PA-C based on data collection and the provider's statements to me.   Provider Disclosure:   The documentation recorded by the scribe accurately reflects the services I personally performed and the decisions made by me.    All risks, benefits and alternatives were discussed with patient.  Patient is in agreement and understands the assessment  and plan.  All questions were answered.    Margarita Brandt PA-C, MPAS  [facility]: Phone: [phone], Fax: [phone]  [facility]: Phone: [phone],  Fax: [phone]  [facility]: Phone: [phone], Fax: [phone]  ____________________________________________    CC: Psoriasis (pustular psoriasis, also concerned about bumps on chest and hand )    HPI:  Ms. Eulalia Mendez is a(n) 56 year old female who presents today as a return patient for follow-up. Last seen by me on 1/30/2020, at which time patient was increased on Stelara to injections every 8 weeks for treatment of pustular psoriasis. Her Stelara was not appoved for 8 weeks, and therefore she has been injecting it at home every 12 weeks. This is generally working well for her.     Today, patient reports that she has a resolving flare of her psoriasis on her hands and feet, but she feels that her psoriasis has been overall well controlled on Stelara.    Patient is otherwise feeling well, without additional skin concerns.    Labs Reviewed:  TB gold, Hemoglobin from 2/9/22 and CMP and CBC from 9/15/21  Patient plans to repeat CC/CMP in 1mo with PCP    Physical Exam:  Vitals: There were no vitals taken for this visit.  SKIN: Focused examination of hands and mid chest was performed.   - Distal finger tips bilaterally with some mild to moderate scaling.  - There is a waxy stuck on tan to brown papule on the right dorsal hand.  - Mid chest, pink flesh 4 mm papule.  - No other lesions of concern on areas examined.     Medications:  Current Outpatient Medications   Medication     atorvastatin (LIPITOR) 40 MG tablet     baclofen (LIORESAL) 10 MG tablet     betamethasone dipropionate (DIPROSONE) 0.05 % external cream     clobetasol (TEMOVATE) 0.05 % external ointment     HYDROcodone-acetaminophen (NORCO) 5-325 MG tablet     hydrOXYzine (VISTARIL) 25 MG  capsule     levothyroxine (SYNTHROID/LEVOTHROID) 175 MCG tablet     losartan (COZAAR) 50 MG tablet     metoprolol succinate ER (TOPROL-XL) 100 MG 24 hr tablet     sertraline (ZOLOFT) 50 MG tablet     sucralfate (CARAFATE) 1 GM tablet     ustekinumab (STELARA) 90 MG/ML     calcipotriene (DOVONOX) 0.005 % external cream     clonazePAM (KLONOPIN) 1 MG tablet     esomeprazole (NEXIUM) 40 MG DR capsule     EUCRISA 2 % ointment     fluconazole (DIFLUCAN) 100 MG tablet     flunisolide (NASALIDE) 25 MCG/ACT (0.025%) SOLN spray     flunisolide (NASALIDE) 25 MCG/ACT (0.025%) SOLN spray     fluorometholone (FML LIQUIFILM) 0.1 % ophthalmic suspension     halobetasol (ULTRAVATE) 0.05 % external cream     hydrOXYzine (ATARAX) 25 MG tablet     loteprednol (LOTEMAX) 0.5 % ophthalmic suspension     ondansetron (ZOFRAN-ODT) 4 MG ODT tab     ranitidine (ZANTAC) 150 MG tablet     tacrolimus (PROTOPIC) 0.1 % external ointment     triamcinolone (KENALOG) 0.1 % external ointment     ustekinumab (STELARA) 90 MG/ML     valACYclovir (VALTREX) 500 MG tablet     No current facility-administered medications for this visit.      Past Medical History:   Patient Active Problem List   Diagnosis     Psoriasis     Lateral Epicondylitis     Trochanteric Bursitis     Limb Pain     No past medical history on file.     CC Cindy Andre  [facility]  [address] [place] [address] on close of this encounter.

## 2022-03-20 ENCOUNTER — HEALTH MAINTENANCE LETTER (OUTPATIENT)
Age: 57
End: 2022-03-20

## 2022-05-15 ENCOUNTER — HEALTH MAINTENANCE LETTER (OUTPATIENT)
Age: 57
End: 2022-05-15

## 2022-08-18 ENCOUNTER — OFFICE VISIT (OUTPATIENT)
Dept: DERMATOLOGY | Facility: CLINIC | Age: 57
End: 2022-08-18
Payer: COMMERCIAL

## 2022-08-18 DIAGNOSIS — D22.9 MULTIPLE BENIGN NEVI: ICD-10-CM

## 2022-08-18 DIAGNOSIS — L40.3 PUSTULAR PSORIASIS OF PALMS AND SOLES: ICD-10-CM

## 2022-08-18 DIAGNOSIS — D18.01 CHERRY ANGIOMA: Primary | ICD-10-CM

## 2022-08-18 DIAGNOSIS — L81.4 SOLAR LENTIGO: ICD-10-CM

## 2022-08-18 PROCEDURE — 99214 OFFICE O/P EST MOD 30 MIN: CPT | Performed by: PHYSICIAN ASSISTANT

## 2022-08-18 RX ORDER — CLOBETASOL PROPIONATE 0.5 MG/G
OINTMENT TOPICAL
Qty: 60 G | Refills: 3 | Status: SHIPPED | OUTPATIENT
Start: 2022-08-18 | End: 2024-01-02

## 2022-08-18 RX ORDER — BETAMETHASONE DIPROPIONATE 0.5 MG/G
CREAM TOPICAL DAILY
Qty: 56 G | Refills: 3 | Status: SHIPPED | OUTPATIENT
Start: 2022-08-18 | End: 2024-01-02

## 2022-08-18 RX ORDER — USTEKINUMAB 90 MG/ML
90 INJECTION, SOLUTION SUBCUTANEOUS
Qty: 1 ML | Refills: 3 | Status: SHIPPED | OUTPATIENT
Start: 2022-08-18 | End: 2023-09-25

## 2022-08-18 ASSESSMENT — PAIN SCALES - GENERAL: PAINLEVEL: NO PAIN (0)

## 2022-08-18 NOTE — LETTER
8/18/2022       RE: Eulalia Mendez  [address] [facility] [address]     Dear Colleague,    Thank you for referring your patient, Eulalia Mendez, to the [facility] DERMATOLOGY CLINIC [place] at [facility]. Please see a copy of my visit note below.    [facility] Dermatology Note  Encounter Date: Aug 18, 2022  Office Visit     Dermatology Problem List:  1. Pustular psoriasis, bilateral soles of feet and bilateral palms with plaque psoriasis to lower legs, lower back, and occipital scalp (originally diagnosed at [place] in 2014.)  - Current tx: Stelara 90 mg/ml every 12 weeks, clobetasol 0.05% ointment, betamethasone 0.05% cream.  - Previous tx: Cosentyx (numerous intolerable side effects such as SOB, aphthous ulcers, thrush and itching), methotrexate (did not help), Humira (stopped working) , Enbrel (stopped working), various topical corticosteroids, calcipotriene, halobetasol, Eucrisa (helpful but too expensive)  - Labs reviewed today, see below.  - Safety labs: TB Gold 2/9/22, CMP 4/9/22, CBC 7/5/22 (oncology monitoring, needed iron infusion)  2. Hx of RA and hypothyroidism  - Rheumatology prefers her to be on a biologic   3. LTM - R mid abdomen - photo 8/18/22  ____________________________________________    Assessment & Plan:    # Pustular psoriasis of palms and soles   Chronic problem. Well-controlled on Stelara. Does get breakthrough plaques about a week prior to her next injection, but she manages this with topicals during this time.   - Safety Labs reviewed today, see below.  - Continue Stelara 90 mg/ml every 3 months; refilled today  - Continue betamethasone 0.05% cream daily prn; refilled today.  - Continue clobetasol 0.05% ointment BID 4 days/week; refilled today.    # Lesion to monitor, right mid abdomen  - Photodocumentation today    # Benign lesions  Multiple benign nevi, solar lentigos, cherry angiomas. Explained to patient  benign nature of lesion. No treatment is necessary at this time unless the lesion changes or becomes symptomatic.   - ABCDs of melanoma were discussed and self skin checks were advised.  - Sun precaution was advised including the use of sun screens of SPF 30 or higher, sun protective clothing, and avoidance of tanning beds.   -Continue annual skin exams    Procedures Performed:   None    Follow-up: 1 year(s) in-person, or earlier for new or changing lesions    Staff and Scribe:     Scribe Disclosure:  I, Ktean Weston, am serving as a scribe to document services personally performed by Margarita Brandt PA-C based on data collection and the provider's statements to me.   Provider Disclosure:   The documentation recorded by the scribe accurately reflects the services I personally performed and the decisions made by me.    All risks, benefits and alternatives were discussed with patient.  Patient is in agreement and understands the assessment and plan.  All questions were answered.    Margarita Brandt PA-C, [facility]  [facility]: Phone: [phone], Fax: [phone]  [facility]: Phone: [phone],  Fax: [phone]  [facility]: Phone: [phone], Fax: [phone]  ____________________________________________    CC: Skin Check (Eulalia is here for a full body skin check.  No spots of concern. )    HPI:  Ms. Eulalia Mendez is a(n) 57 year old female who presents today as a return patient for a FBSE. Last seen in dermatology by me on 3/17/22, at which time patient was continued on Stelara 90 mg/ml every 3 months, betamethasone 0.05% cream daily prn, and clobetasol 0.05% ointment BID 4 days/week for treatment of pustular psoriasis.    Today, patient does not endorse any spots of concern. She is still using Stelara to great effect, but notes that her symptoms return when she is in between injections. She uses  clobetasol and betamethasone while between injections to manage her symptoms, which helps some. All in all, she is happy with the results of her current medication regimen. She additionally notes a spot of concern to her mid chest. Has been feeling well aside from recent anemia for which she received an iron infusion. Patient has colonoscopy set up as she has had this prior with rectal bleeding. Oncology is rechecking her CBC in about 1mo she states.     Patient is otherwise feeling well, without additional skin concerns.    Labs Reviewed:  - QuantiFERON Gold: negative 2/9/22  - CMP: normal 4/19/22   - CBC: abnormal 7/5/22; rechecking in 2 months per oncology - recently had iron infusion    Physical Exam:  Vitals: There were no vitals taken for this visit.  SKIN: Total skin excluding the undergarment areas was performed. The exam included the head/face, neck, both arms, chest, back, abdomen, both legs, digits and/or nails.   - Eric II  - Pink flesh 4 mm papule to the mid chest. No change from lat visit. Appears benign.  - 4 mm brown macule with an irregular border to the right mid abdomen.  - Mild xerosis to the bilateral soles of the feet.  - Very mild xerosis to the bilateral palms of the hands.  - There are dome shaped bright red papules on the trunk and extremities.   - Multiple regular brown pigmented macules and papules are identified on the trunk and extremities (< 100).   - Scattered brown macules on sun exposed areas.  - No other lesions of concern on areas examined.           Medications:  Current Outpatient Medications   Medication     atorvastatin (LIPITOR) 40 MG tablet     baclofen (LIORESAL) 10 MG tablet     betamethasone dipropionate (DIPROSONE) 0.05 % external cream     clobetasol (TEMOVATE) 0.05 % external ointment     HYDROcodone-acetaminophen (NORCO) 5-325 MG tablet     hydrOXYzine (VISTARIL) 25 MG capsule     levothyroxine (SYNTHROID/LEVOTHROID) 175 MCG tablet     losartan (COZAAR) 50 MG  tablet     metoprolol succinate ER (TOPROL-XL) 100 MG 24 hr tablet     sertraline (ZOLOFT) 50 MG tablet     sucralfate (CARAFATE) 1 GM tablet     ustekinumab (STELARA) 90 MG/ML     calcipotriene (DOVONOX) 0.005 % external cream     clonazePAM (KLONOPIN) 1 MG tablet     esomeprazole (NEXIUM) 40 MG DR capsule     fluconazole (DIFLUCAN) 100 MG tablet     flunisolide (NASALIDE) 25 MCG/ACT (0.025%) SOLN spray     flunisolide (NASALIDE) 25 MCG/ACT (0.025%) SOLN spray     fluorometholone (FML LIQUIFILM) 0.1 % ophthalmic suspension     hydrOXYzine (ATARAX) 25 MG tablet     loteprednol (LOTEMAX) 0.5 % ophthalmic suspension     ondansetron (ZOFRAN-ODT) 4 MG ODT tab     ranitidine (ZANTAC) 150 MG tablet     tacrolimus (PROTOPIC) 0.1 % external ointment     triamcinolone (KENALOG) 0.1 % external ointment     ustekinumab (STELARA) 90 MG/ML     valACYclovir (VALTREX) 500 MG tablet     No current facility-administered medications for this visit.      Past Medical History:   Patient Active Problem List   Diagnosis     Psoriasis     Lateral Epicondylitis     Trochanteric Bursitis     Limb Pain     No past medical history on file.

## 2022-08-18 NOTE — PROGRESS NOTES
[facility] Dermatology Note  Encounter Date: Aug 18, 2022  Office Visit     Dermatology Problem List:  1. Pustular psoriasis, bilateral soles of feet and bilateral palms with plaque psoriasis to lower legs, lower back, and occipital scalp (originally diagnosed at [place] in 2014.)  - Current tx: Stelara 90 mg/ml every 12 weeks, clobetasol 0.05% ointment, betamethasone 0.05% cream.  - Previous tx: Cosentyx (numerous intolerable side effects such as SOB, aphthous ulcers, thrush and itching), methotrexate (did not help), Humira (stopped working) , Enbrel (stopped working), various topical corticosteroids, calcipotriene, halobetasol, Eucrisa (helpful but too expensive)  - Labs reviewed today, see below.  - Safety labs: TB Gold 2/9/22, CMP 4/9/22, CBC 7/5/22 (oncology monitoring, needed iron infusion)  2. Hx of RA and hypothyroidism  - Rheumatology prefers her to be on a biologic   3. LTM - R mid abdomen - photo 8/18/22  ____________________________________________    Assessment & Plan:    # Pustular psoriasis of palms and soles   Chronic problem. Well-controlled on Stelara. Does get breakthrough plaques about a week prior to her next injection, but she manages this with topicals during this time.   - Safety Labs reviewed today, see below.  - Continue Stelara 90 mg/ml every 3 months; refilled today  - Continue betamethasone 0.05% cream daily prn; refilled today.  - Continue clobetasol 0.05% ointment BID 4 days/week; refilled today.    # Lesion to monitor, right mid abdomen  - Photodocumentation today    # Benign lesions  Multiple benign nevi, solar lentigos, cherry angiomas. Explained to patient benign nature of lesion. No treatment is necessary at this time unless the lesion changes or becomes symptomatic.   - ABCDs of melanoma were discussed and self skin checks were advised.  - Sun precaution was advised including the use of sun screens of SPF 30 or higher, sun protective clothing, and avoidance of  tanning beds.   -Continue annual skin exams    Procedures Performed:   None    Follow-up: 1 year(s) in-person, or earlier for new or changing lesions    Staff and Scribe:     Scribe Disclosure:  I, Ketan Weston, am serving as a scribe to document services personally performed by Margarita Brandt PA-C based on data collection and the provider's statements to me.   Provider Disclosure:   The documentation recorded by the scribe accurately reflects the services I personally performed and the decisions made by me.    All risks, benefits and alternatives were discussed with patient.  Patient is in agreement and understands the assessment and plan.  All questions were answered.    Margarita Brandt PA-C, [facility]  [facility] [place]: Phone: [phone], Fax: [phone]  [facility]: Phone: [phone],  Fax: [phone]  [facility]: Phone: [phone], Fax: [phone]  ____________________________________________    CC: Skin Check (Eulalia is here for a full body skin check.  No spots of concern. )    HPI:  Ms. Eulalia Mendez is a(n) 57 year old female who presents today as a return patient for a FBSE. Last seen in dermatology by me on 3/17/22, at which time patient was continued on Stelara 90 mg/ml every 3 months, betamethasone 0.05% cream daily prn, and clobetasol 0.05% ointment BID 4 days/week for treatment of pustular psoriasis.    Today, patient does not endorse any spots of concern. She is still using Stelara to great effect, but notes that her symptoms return when she is in between injections. She uses clobetasol and betamethasone while between injections to manage her symptoms, which helps some. All in all, she is happy with the results of her current medication regimen. She additionally notes a spot of concern to her mid chest. Has been feeling well aside from recent anemia for which she received an iron infusion.  Patient has colonoscopy set up as she has had this prior with rectal bleeding. Oncology is rechecking her CBC in about 1mo she states.     Patient is otherwise feeling well, without additional skin concerns.    Labs Reviewed:  - QuantiFERON Gold: negative 2/9/22  - CMP: normal 4/19/22   - CBC: abnormal 7/5/22; rechecking in 2 months per oncology - recently had iron infusion    Physical Exam:  Vitals: There were no vitals taken for this visit.  SKIN: Total skin excluding the undergarment areas was performed. The exam included the head/face, neck, both arms, chest, back, abdomen, both legs, digits and/or nails.   - Eric II  - Pink flesh 4 mm papule to the mid chest. No change from lat visit. Appears benign.  - 4 mm brown macule with an irregular border to the right mid abdomen.  - Mild xerosis to the bilateral soles of the feet.  - Very mild xerosis to the bilateral palms of the hands.  - There are dome shaped bright red papules on the trunk and extremities.   - Multiple regular brown pigmented macules and papules are identified on the trunk and extremities (< 100).   - Scattered brown macules on sun exposed areas.  - No other lesions of concern on areas examined.           Medications:  Current Outpatient Medications   Medication     atorvastatin (LIPITOR) 40 MG tablet     baclofen (LIORESAL) 10 MG tablet     betamethasone dipropionate (DIPROSONE) 0.05 % external cream     clobetasol (TEMOVATE) 0.05 % external ointment     HYDROcodone-acetaminophen (NORCO) 5-325 MG tablet     hydrOXYzine (VISTARIL) 25 MG capsule     levothyroxine (SYNTHROID/LEVOTHROID) 175 MCG tablet     losartan (COZAAR) 50 MG tablet     metoprolol succinate ER (TOPROL-XL) 100 MG 24 hr tablet     sertraline (ZOLOFT) 50 MG tablet     sucralfate (CARAFATE) 1 GM tablet     ustekinumab (STELARA) 90 MG/ML     calcipotriene (DOVONOX) 0.005 % external cream     clonazePAM (KLONOPIN) 1 MG tablet     esomeprazole (NEXIUM) 40 MG DR capsule      fluconazole (DIFLUCAN) 100 MG tablet     flunisolide (NASALIDE) 25 MCG/ACT (0.025%) SOLN spray     flunisolide (NASALIDE) 25 MCG/ACT (0.025%) SOLN spray     fluorometholone (FML LIQUIFILM) 0.1 % ophthalmic suspension     hydrOXYzine (ATARAX) 25 MG tablet     loteprednol (LOTEMAX) 0.5 % ophthalmic suspension     ondansetron (ZOFRAN-ODT) 4 MG ODT tab     ranitidine (ZANTAC) 150 MG tablet     tacrolimus (PROTOPIC) 0.1 % external ointment     triamcinolone (KENALOG) 0.1 % external ointment     ustekinumab (STELARA) 90 MG/ML     valACYclovir (VALTREX) 500 MG tablet     No current facility-administered medications for this visit.      Past Medical History:   Patient Active Problem List   Diagnosis     Psoriasis     Lateral Epicondylitis     Trochanteric Bursitis     Limb Pain     No past medical history on file.

## 2022-08-18 NOTE — PATIENT INSTRUCTIONS
Patient Education     Checking for Skin Cancer  You can find cancer early by checking your skin each month. There are 3 kinds of skin cancer. They are melanoma, basal cell carcinoma, and squamous cell carcinoma. Doing monthly skin checks is the best way to find new marks or skin changes. Follow the instructions below for checking your skin.   The ABCDEs of checking moles for melanoma   Check your moles or growths for signs of melanoma using ABCDE:   Asymmetry: the sides of the mole or growth don t match  Border: the edges are ragged, notched, or blurred  Color: the color within the mole or growth varies  Diameter: the mole or growth is larger than 6 mm (size of a pencil eraser)  Evolving: the size, shape, or color of the mole or growth is changing (evolving is not shown in the images below)    Checking for other types of skin cancer  Basal cell carcinoma or squamous cell carcinoma have symptoms such as:     A spot or mole that looks different from all other marks on your skin  Changes in how an area feels, such as itching, tenderness, or pain  Changes in the skin's surface, such as oozing, bleeding, or scaliness  A sore that does not heal  New swelling or redness beyond the border of a mole    Who s at risk?  Anyone can get skin cancer. But you are at greater risk if you have:   Fair skin, light-colored hair, or light-colored eyes  Many moles or abnormal moles on your skin  A history of sunburns from sunlight or tanning beds  A family history of skin cancer  A history of exposure to radiation or chemicals  A weakened immune system  If you have had skin cancer in the past, you are at risk for recurring skin cancer.   How to check your skin  Do your monthly skin checkups in front of a full-length mirror. Check all parts of your body, including your:   Head (ears, face, neck, and scalp)  Torso (front, back, and sides)  Arms (tops, undersides, upper, and lower armpits)  Hands (palms, backs, and fingers, including  under the nails)  Buttocks and genitals  Legs (front, back, and sides)  Feet (tops, soles, toes, including under the nails, and between toes)  If you have a lot of moles, take digital photos of them each month. Make sure to take photos both up close and from a distance. These can help you see if any moles change over time.   Most skin changes are not cancer. But if you see any changes in your skin, call your doctor right away. Only he or she can diagnose a problem. If you have skin cancer, seeing your doctor can be the first step toward getting the treatment that could save your life.   [employer] last reviewed this educational content on 4/1/2019 2000-2020 [employer]. [address] [place] [address], [place] [address]. All rights reserved. This information is not intended as a substitute for professional medical care. Always follow your healthcare professional's instructions.       When should I call my doctor?  If you are worsening or not improving, please, contact us or seek urgent care as noted below.     Who should I call with questions (adults)?  [facility] (adult and pediatric): [phone]  [facility] (adult): [phone]  For urgent needs outside of business hours call the [facility] at [phone] and ask for the dermatology resident on call to be paged  If this is a medical emergency and you are unable to reach an ER, Call [phone]    Who should I call with questions (pediatric)?  [facility]- Pediatric Dermatology  Dr. Guadalupe Weeks, Dr. Lori Slater, Dr. Ena Teague, CATHERINE Harrell, Dr. Catrina Nichols, Dr. Sylvia Pena & Dr. Ant Watters  Non-urgent nurse triage line; [phone]- Dennise and Olga TORRES Care Coordinatormichael Doty (/Complex ) [phone]    If you need a prescription refill, please contact your pharmacy. Refills are approved or denied by our  Physicians during normal business hours, Monday through Fridays  Per office policy, refills will not be granted if you have not been seen within the past year (or sooner depending on your child's condition)    Scheduling Information:  Pediatric Appointment Scheduling and Call Center [phone]  Radiology Scheduling- [phone]  Sedation Unit Scheduling- [phone]  [place] Scheduling- General [phone]; Pediatric Dermatology [phone]  Main  Services: [phone]  Wolof: [phone]  Burmese: [phone]  Hmong/Australian/Maori: [phone]  Preadmission Nursing Department Fax Number: [phone] (Fax all pre-operative paperwork to this number)    For urgent matters arising during evenings, weekends, or holidays that cannot wait for normal business hours please call [phone] and ask for the dermatology resident on call to be paged.

## 2022-08-18 NOTE — NURSING NOTE
Dermatology Rooming Note    Eulalia Mendez's goals for this visit include:   Chief Complaint   Patient presents with     Skin Check     Eulalia is here for a full body skin check.  No spots of concern.      Cindy Morales, CMA

## 2022-09-10 ENCOUNTER — HEALTH MAINTENANCE LETTER (OUTPATIENT)
Age: 57
End: 2022-09-10

## 2023-01-22 ENCOUNTER — HEALTH MAINTENANCE LETTER (OUTPATIENT)
Age: 58
End: 2023-01-22

## 2023-09-19 DIAGNOSIS — L40.3 PUSTULAR PSORIASIS OF PALMS AND SOLES: ICD-10-CM

## 2023-09-24 NOTE — TELEPHONE ENCOUNTER
JOSE M PFS 90MG/1ML   Last Written Prescription Date:   8/18/2022  Last Fill Quantity: 1,   # refills: 3  Last Office Visit :  8/18/2022  Future Office visit:  3/15/2023    Routing refill request to provider for review/approval because:  Drug not on the [facility], P or [facility] refill protocol or controlled substance    Anya Wong RN  Central Triage Red Flags/Med Refills

## 2023-09-25 RX ORDER — USTEKINUMAB 90 MG/ML
INJECTION, SOLUTION SUBCUTANEOUS
Qty: 1 ML | Refills: 3 | Status: SHIPPED | OUTPATIENT
Start: 2023-09-25 | End: 2023-10-09

## 2023-10-02 NOTE — TELEPHONE ENCOUNTER
Stelara resent to [facility].   Zyclara Pregnancy And Lactation Text: This medication is Pregnancy Category C. It is unknown if this medication is excreted in breast milk.

## 2023-10-09 ENCOUNTER — MYC MEDICAL ADVICE (OUTPATIENT)
Dept: DERMATOLOGY | Facility: CLINIC | Age: 58
End: 2023-10-09
Payer: COMMERCIAL

## 2023-10-09 DIAGNOSIS — L40.3 PUSTULAR PSORIASIS OF PALMS AND SOLES: ICD-10-CM

## 2023-10-11 RX ORDER — USTEKINUMAB 90 MG/ML
INJECTION, SOLUTION SUBCUTANEOUS
Qty: 1 ML | Refills: 3 | Status: SHIPPED | OUTPATIENT
Start: 2023-10-11 | End: 2024-09-25

## 2023-11-07 DIAGNOSIS — L40.3 PUSTULAR PSORIASIS OF PALMS AND SOLES: ICD-10-CM

## 2023-11-09 RX ORDER — CLOBETASOL PROPIONATE 0.5 MG/G
OINTMENT TOPICAL
Qty: 60 G | Refills: 3 | OUTPATIENT
Start: 2023-11-09

## 2023-12-27 DIAGNOSIS — L40.3 PUSTULAR PSORIASIS OF PALMS AND SOLES: ICD-10-CM

## 2023-12-29 NOTE — TELEPHONE ENCOUNTER
CLOBETASOL 0.05% OINTMENT   Last Written Prescription Date:   8/18/2022  Last Fill Quantity: 60,   # refills: 3  Last Office Visit : 8/18/2022  Future Office visit:  3/15/2024  Routing refill request to provider for review/approval because:  Gaps in refills and office visit  But Pt has visit in March of next year.  Refer to Provider for review and Okay to fill to cover Pt until office visit.     BETAMETHASONE DP 0.05% CRM   Last Written Prescription Date:   8/18/2022  Last Fill Quantity: 56   # refills: 3  Last Office Visit : 8/18/2022  Future Office visit:  3/15/2024  Routing refill request to provider for review/approval because:  Gaps in refills and office visit  But Pt has visit in March of next year.  Refer to Provider for review and Okay to fill to cover Pt until office visit.     Anya Wong RN  Central Triage Red Flags/Med Refills

## 2024-01-02 RX ORDER — BETAMETHASONE DIPROPIONATE 0.5 MG/G
CREAM TOPICAL
Qty: 60 G | Refills: 3 | Status: SHIPPED | OUTPATIENT
Start: 2024-01-02

## 2024-01-02 RX ORDER — CLOBETASOL PROPIONATE 0.5 MG/G
OINTMENT TOPICAL
Qty: 60 G | Refills: 3 | Status: SHIPPED | OUTPATIENT
Start: 2024-01-02 | End: 2024-03-15

## 2024-02-06 ENCOUNTER — TELEPHONE (OUTPATIENT)
Dept: DERMATOLOGY | Facility: CLINIC | Age: 59
End: 2024-02-06
Payer: COMMERCIAL

## 2024-02-06 NOTE — TELEPHONE ENCOUNTER
PA Initiation    Medication: STELARA 90 MG/ML SC SOSY  Insurance Company: [employer] - Phone [phone] Fax [phone]  Pharmacy Filling the Rx: [facility] [place]  Filling Pharmacy Phone:    Filling Pharmacy Fax:    Start Date: 2/6/2024    [id number]

## 2024-02-09 NOTE — TELEPHONE ENCOUNTER
Prior Authorization Approval    Medication: STELARA 90 MG/ML SC SOSY  Authorization Effective Date: 1/9/2024  Authorization Expiration Date: 5/9/2024  Approved Dose/Quantity: 1 fpr 84 days  Reference #: [id number]   Insurance Company: [employer] - Phone [phone] Fax [phone]  Expected CoPay: $    CoPay Card Available: No    Financial Assistance Needed: no  Which Pharmacy is filling the prescription: [facility]  Pharmacy Notified: yes  Patient Notified:

## 2024-02-18 ENCOUNTER — HEALTH MAINTENANCE LETTER (OUTPATIENT)
Age: 59
End: 2024-02-18

## 2024-03-15 ENCOUNTER — OFFICE VISIT (OUTPATIENT)
Dept: DERMATOLOGY | Facility: CLINIC | Age: 59
End: 2024-03-15
Payer: COMMERCIAL

## 2024-03-15 ENCOUNTER — LAB (OUTPATIENT)
Dept: LAB | Facility: CLINIC | Age: 59
End: 2024-03-15
Payer: COMMERCIAL

## 2024-03-15 DIAGNOSIS — L40.3 PUSTULAR PSORIASIS OF PALMS AND SOLES: ICD-10-CM

## 2024-03-15 DIAGNOSIS — Z51.81 MEDICATION MONITORING ENCOUNTER: ICD-10-CM

## 2024-03-15 DIAGNOSIS — D22.9 MULTIPLE BENIGN NEVI: ICD-10-CM

## 2024-03-15 DIAGNOSIS — L82.1 SEBORRHEIC KERATOSES: ICD-10-CM

## 2024-03-15 DIAGNOSIS — L81.4 SOLAR LENTIGO: ICD-10-CM

## 2024-03-15 DIAGNOSIS — D18.01 CHERRY ANGIOMA: Primary | ICD-10-CM

## 2024-03-15 LAB
ALBUMIN SERPL BCG-MCNC: 4.6 G/DL (ref 3.5–5.2)
ALP SERPL-CCNC: 118 U/L (ref 40–150)
ALT SERPL W P-5'-P-CCNC: 64 U/L (ref 0–50)
ANION GAP SERPL CALCULATED.3IONS-SCNC: 13 MMOL/L (ref 7–15)
AST SERPL W P-5'-P-CCNC: 65 U/L (ref 0–45)
BASOPHILS # BLD AUTO: 0.1 10E3/UL (ref 0–0.2)
BASOPHILS NFR BLD AUTO: 1 %
BILIRUB SERPL-MCNC: 0.4 MG/DL
BUN SERPL-MCNC: 21 MG/DL (ref 8–23)
CALCIUM SERPL-MCNC: 9.5 MG/DL (ref 8.6–10)
CHLORIDE SERPL-SCNC: 102 MMOL/L (ref 98–107)
CREAT SERPL-MCNC: 0.78 MG/DL (ref 0.51–0.95)
DEPRECATED HCO3 PLAS-SCNC: 23 MMOL/L (ref 22–29)
EGFRCR SERPLBLD CKD-EPI 2021: 87 ML/MIN/1.73M2
EOSINOPHIL # BLD AUTO: 0.2 10E3/UL (ref 0–0.7)
EOSINOPHIL NFR BLD AUTO: 2 %
ERYTHROCYTE [DISTWIDTH] IN BLOOD BY AUTOMATED COUNT: 12.8 % (ref 10–15)
GLUCOSE SERPL-MCNC: 111 MG/DL (ref 70–99)
HCT VFR BLD AUTO: 38.2 % (ref 35–47)
HGB BLD-MCNC: 13.4 G/DL (ref 11.7–15.7)
IMM GRANULOCYTES # BLD: 0 10E3/UL
IMM GRANULOCYTES NFR BLD: 0 %
LYMPHOCYTES # BLD AUTO: 2.1 10E3/UL (ref 0.8–5.3)
LYMPHOCYTES NFR BLD AUTO: 28 %
MCH RBC QN AUTO: 34.5 PG (ref 26.5–33)
MCHC RBC AUTO-ENTMCNC: 35.1 G/DL (ref 31.5–36.5)
MCV RBC AUTO: 99 FL (ref 78–100)
MONOCYTES # BLD AUTO: 0.5 10E3/UL (ref 0–1.3)
MONOCYTES NFR BLD AUTO: 7 %
NEUTROPHILS # BLD AUTO: 4.5 10E3/UL (ref 1.6–8.3)
NEUTROPHILS NFR BLD AUTO: 62 %
NRBC # BLD AUTO: 0 10E3/UL
NRBC BLD AUTO-RTO: 0 /100
PLATELET # BLD AUTO: 188 10E3/UL (ref 150–450)
POTASSIUM SERPL-SCNC: 3.8 MMOL/L (ref 3.4–5.3)
PROT SERPL-MCNC: 7.6 G/DL (ref 6.4–8.3)
RBC # BLD AUTO: 3.88 10E6/UL (ref 3.8–5.2)
SODIUM SERPL-SCNC: 138 MMOL/L (ref 135–145)
WBC # BLD AUTO: 7.3 10E3/UL (ref 4–11)

## 2024-03-15 PROCEDURE — 99000 SPECIMEN HANDLING OFFICE-LAB: CPT | Performed by: PATHOLOGY

## 2024-03-15 PROCEDURE — 99214 OFFICE O/P EST MOD 30 MIN: CPT | Performed by: PHYSICIAN ASSISTANT

## 2024-03-15 PROCEDURE — 80053 COMPREHEN METABOLIC PANEL: CPT | Performed by: PATHOLOGY

## 2024-03-15 PROCEDURE — 85025 COMPLETE CBC W/AUTO DIFF WBC: CPT | Performed by: PATHOLOGY

## 2024-03-15 PROCEDURE — 36415 COLL VENOUS BLD VENIPUNCTURE: CPT | Performed by: PATHOLOGY

## 2024-03-15 PROCEDURE — 86481 TB AG RESPONSE T-CELL SUSP: CPT | Performed by: PHYSICIAN ASSISTANT

## 2024-03-15 RX ORDER — CLOBETASOL PROPIONATE 0.5 MG/G
OINTMENT TOPICAL
Qty: 60 G | Refills: 3 | Status: SHIPPED | OUTPATIENT
Start: 2024-03-15

## 2024-03-15 ASSESSMENT — PAIN SCALES - GENERAL: PAINLEVEL: EXTREME PAIN (8)

## 2024-03-15 NOTE — PROGRESS NOTES
[facility] Dermatology Note  Encounter Date: Mar 15, 2024  Office Visit      Dermatology Problem List:    1. 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  - Previous tx: Cosentyx (numerous intolerable side effects such as SOB, aphthous ulcers, thrush and itching), methotrexate (did not help), Humira (stopped working) , Enbrel (stopped working), various topical corticosteroids, calcipotriene, halobetasol, Eucrisa (helpful but too expensive), NBUVB (hand unit at home but flares condition)  - Labs reviewed today, see below.  - Safety labs: TB Gold 2/9/22, CMP 4/9/22, CBC 7/5/22 (oncology monitoring, needed iron infusion)  2. Hx of RA and hypothyroidism  - Rheumatology prefers her to be on a biologic   3. LTM - R mid abdomen - photo 8/18/22  ____________________________________________    Assessment & Plan:    # Pustular psoriasis of palms and soles   Chronic problem. Well-controlled on Stelara in general, but does start to get flares again about 3 weeks prior to next injection. She tries to manage with topicals during this time, but it now needing clobetasol daily during this period. Because patient is not well controlled we need to add something else to help her. she has tried and failed numerous other medications (see above). Light therapy makes this problem worse for her, but she does have a hand unit at home.   - Labs ordered - CBC, CMP and TB gold  - Continue Stelara 90 mg/ml every 3 months; refilled today - has tried to get insurance approval for stelara injections every 2mo, but this has been denied  - Ordered Otezla to take 30mg po BID, If approved by insurance, if not will have to try TYK #2 - Deucravacitinib - patient preference is the Deucravacitinib because of  the once daily dosing but I explained to her that unfortunately insurance may require a trial and failure of otezla before it will cover this medication.  -Discussed with patient risks and benefits of Apremilast. Discussed risks including GI upset, nausea, vomiting, diarrhea, depression, weight loss, headaches, and URI. The patient denies a history of depression, current changes in mood or suicidal ideation. The patient will contact clinic if they experience any changes in mood.   -Patient is not pregnant or breastfeeding  -Medication list was reviewed for drug interactions(including CYP-450 inducers) and none were noted.   -The patient was instructed not to crush, chew or split the tablet and that the tab may be taken with or without food.   -Dosing will be as follows: Day 1: 10 mg in morning, Day 2: 10 mg in morning and 10 mg in evening, Day 3: 10 mg in morning and 20 mg in evening, Day 4: 20 mg in morning and 20 mg in evening, Day 5: 20 mg in morning and 30 mg in evening, Day 6 and thereafter: 30 mg twice daily.   - Continue clobetasol 0.05% ointment BID 4 days/week; refilled today.  - smoking cessation recommended.  - ordered a medication management referral    Patient needs a FBSE, but this will need to be done at a separate appointment.     Procedures Performed:   None     Follow-up: 6 month(s) in-person, or earlier for new or changing lesions    Staff and scribe:    Rachele VILLALOBOS, am serving as a scribe to document services personally performed by Margarita Brandt PA-C based on data collection and the provider's statements to me.      All risks, benefits and alternatives were discussed with patient.  Patient is in agreement and understands the assessment and plan.  All questions were answered.    Margarita Brandt PA-C, MPAS  [facility] [place]: Phone: [phone], Fax: [phone]  [facility]: Phone: [phone],  Fax:  [phone]  [facility] [place]: Phone: [phone], Fax: [phone]  ____________________________________________    CC: Derm Problem (Stelara- things are going. Has current flare on L lower leg, gets flares occasionally on back and neck. /FBSE- spot of concern on chest)      Reviewed patients past medical history and pertinent chart review prior to patient's visit today.     HPI:  Ms. Eulalia Mendez is a 58 year old female who presents today as a return patient for PSO follow up.     Today patient reports she is due for her next stelara injection in 2 weeks. The PSO starts to come back about 3 weeks prior to her injections,. She is needing clobetasol  daily to manage her flares again.     Requesting a refill on stelara.     Patient is otherwise feeling well, without additional concerns.    Labs:  N/A    Physical Exam:  Vitals: There were no vitals taken for this visit.  SKIN: Focused exam which includes the head/face, both arms,  digits and/or nails, was examined.   - scaly patches on tips of fingers noted.  - No other lesions of concern on areas examined.     Medications:  Current Outpatient Medications   Medication    atorvastatin (LIPITOR) 40 MG tablet    baclofen (LIORESAL) 10 MG tablet    betamethasone dipropionate (DIPROSONE) 0.05 % external cream    clobetasol (TEMOVATE) 0.05 % external ointment    HYDROcodone-acetaminophen (NORCO) 5-325 MG tablet    hydrOXYzine (VISTARIL) 25 MG capsule    levothyroxine (SYNTHROID/LEVOTHROID) 175 MCG tablet    losartan (COZAAR) 50 MG tablet    sertraline (ZOLOFT) 50 MG tablet    ustekinumab (STELARA) 90 MG/ML    calcipotriene (DOVONOX) 0.005 % external cream    clonazePAM (KLONOPIN) 1 MG tablet    esomeprazole (NEXIUM) 40 MG DR capsule    fluconazole (DIFLUCAN) 100 MG tablet    flunisolide (NASALIDE) 25 MCG/ACT (0.025%) SOLN spray    flunisolide (NASALIDE) 25 MCG/ACT (0.025%) SOLN spray    fluorometholone (FML LIQUIFILM) 0.1 % ophthalmic suspension     hydrOXYzine (ATARAX) 25 MG tablet    loteprednol (LOTEMAX) 0.5 % ophthalmic suspension    metoprolol succinate ER (TOPROL-XL) 100 MG 24 hr tablet    ondansetron (ZOFRAN-ODT) 4 MG ODT tab    ranitidine (ZANTAC) 150 MG tablet    sucralfate (CARAFATE) 1 GM tablet    tacrolimus (PROTOPIC) 0.1 % external ointment    triamcinolone (KENALOG) 0.1 % external ointment    ustekinumab (STELARA) 90 MG/ML    valACYclovir (VALTREX) 500 MG tablet     No current facility-administered medications for this visit.      Past Medical/Surgical History:   Patient Active Problem List   Diagnosis    Psoriasis    Lateral Epicondylitis    Trochanteric Bursitis    Limb Pain    Cherry angioma    Multiple benign nevi    Solar lentigo    Seborrheic keratoses     History reviewed. No pertinent past medical history.

## 2024-03-15 NOTE — NURSING NOTE
Dermatology Rooming Note    Eulalia Mendez's goals for this visit include:   Chief Complaint   Patient presents with    Derm Problem     Stelara- things are going. Has current flare on L lower leg, gets flares occasionally on back and neck.   FBSE- spot of concern on chest     Smita Torres, EMT    
7

## 2024-03-15 NOTE — LETTER
3/15/2024       RE: Eulalia Mendez  [address] [facility] [address]     Dear Colleague,    Thank you for referring your patient, Eulalia Mendez, to the [facility] DERMATOLOGY CLINIC [place] at [facility]. Please see a copy of my visit note below.    [facility] Dermatology Note  Encounter Date: Mar 15, 2024  Office Visit      Dermatology Problem List:    1. 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  - Previous tx: Cosentyx (numerous intolerable side effects such as SOB, aphthous ulcers, thrush and itching), methotrexate (did not help), Humira (stopped working) , Enbrel (stopped working), various topical corticosteroids, calcipotriene, halobetasol, Eucrisa (helpful but too expensive), NBUVB (hand unit at home but flares condition)  - Labs reviewed today, see below.  - Safety labs: TB Gold 2/9/22, CMP 4/9/22, CBC 7/5/22 (oncology monitoring, needed iron infusion)  2. Hx of RA and hypothyroidism  - Rheumatology prefers her to be on a biologic   3. LTM - R mid abdomen - photo 8/18/22  ____________________________________________    Assessment & Plan:    # Pustular psoriasis of palms and soles   Chronic problem. Well-controlled on Stelara in general, but does start to get flares again about 3 weeks prior to next injection. She tries to manage with topicals during this time, but it now needing clobetasol daily during this period. Because patient is not well controlled we need to add something else to help her. she has tried and failed numerous other medications (see above). Light therapy makes this problem worse for her, but she does have a hand unit at home.   - Labs ordered - CBC, CMP and TB gold  - Continue  Stelara 90 mg/ml every 3 months; refilled today - has tried to get insurance approval for stelara injections every 2mo, but this has been denied  - Ordered Otezla to take 30mg po BID, If approved by insurance, if not will have to try TYK #2 - Deucravacitinib - patient preference is the Deucravacitinib because of the once daily dosing but I explained to her that unfortunately insurance may require a trial and failure of otezla before it will cover this medication.  -Discussed with patient risks and benefits of Apremilast. Discussed risks including GI upset, nausea, vomiting, diarrhea, depression, weight loss, headaches, and URI. The patient denies a history of depression, current changes in mood or suicidal ideation. The patient will contact clinic if they experience any changes in mood.   -Patient is not pregnant or breastfeeding  -Medication list was reviewed for drug interactions(including CYP-450 inducers) and none were noted.   -The patient was instructed not to crush, chew or split the tablet and that the tab may be taken with or without food.   -Dosing will be as follows: Day 1: 10 mg in morning, Day 2: 10 mg in morning and 10 mg in evening, Day 3: 10 mg in morning and 20 mg in evening, Day 4: 20 mg in morning and 20 mg in evening, Day 5: 20 mg in morning and 30 mg in evening, Day 6 and thereafter: 30 mg twice daily.   - Continue clobetasol 0.05% ointment BID 4 days/week; refilled today.  - smoking cessation recommended.  - ordered a medication management referral    Patient needs a FBSE, but this will need to be done at a separate appointment.     Procedures Performed:   None     Follow-up: 6 month(s) in-person, or earlier for new or changing lesions    Staff and scribe:    I, Rachele Thomas, am serving as a scribe to document services personally performed by Margarita Brandt PA-C based on data collection and the provider's statements to me.      All risks, benefits and alternatives were discussed with  patient.  Patient is in agreement and understands the assessment and plan.  All questions were answered.    Margarita Brandt PA-C, MPAS  [facility] [place]: Phone: [phone], Fax: [phone]  [facility]: Phone: [phone],  Fax: [phone]  [facility]: Phone: [phone], Fax: [phone]  ____________________________________________    CC: Derm Problem (Stelara- things are going. Has current flare on L lower leg, gets flares occasionally on back and neck. /FBSE- spot of concern on chest)      Reviewed patients past medical history and pertinent chart review prior to patient's visit today.     HPI:  Ms. Eulalia Mendez is a 58 year old female who presents today as a return patient for PSO follow up.     Today patient reports she is due for her next stelara injection in 2 weeks. The PSO starts to come back about 3 weeks prior to her injections,. She is needing clobetasol  daily to manage her flares again.     Requesting a refill on stelara.     Patient is otherwise feeling well, without additional concerns.    Labs:  N/A    Physical Exam:  Vitals: There were no vitals taken for this visit.  SKIN: Focused exam which includes the head/face, both arms,  digits and/or nails, was examined.   - scaly patches on tips of fingers noted.  - No other lesions of concern on areas examined.     Medications:  Current Outpatient Medications   Medication    atorvastatin (LIPITOR) 40 MG tablet    baclofen (LIORESAL) 10 MG tablet    betamethasone dipropionate (DIPROSONE) 0.05 % external cream    clobetasol (TEMOVATE) 0.05 % external ointment    HYDROcodone-acetaminophen (NORCO) 5-325 MG tablet    hydrOXYzine (VISTARIL) 25 MG capsule    levothyroxine (SYNTHROID/LEVOTHROID) 175 MCG tablet    losartan (COZAAR) 50 MG tablet    sertraline (ZOLOFT) 50 MG tablet    ustekinumab (STELARA) 90 MG/ML    calcipotriene (DOVONOX) 0.005 %  external cream    clonazePAM (KLONOPIN) 1 MG tablet    esomeprazole (NEXIUM) 40 MG DR capsule    fluconazole (DIFLUCAN) 100 MG tablet    flunisolide (NASALIDE) 25 MCG/ACT (0.025%) SOLN spray    flunisolide (NASALIDE) 25 MCG/ACT (0.025%) SOLN spray    fluorometholone (FML LIQUIFILM) 0.1 % ophthalmic suspension    hydrOXYzine (ATARAX) 25 MG tablet    loteprednol (LOTEMAX) 0.5 % ophthalmic suspension    metoprolol succinate ER (TOPROL-XL) 100 MG 24 hr tablet    ondansetron (ZOFRAN-ODT) 4 MG ODT tab    ranitidine (ZANTAC) 150 MG tablet    sucralfate (CARAFATE) 1 GM tablet    tacrolimus (PROTOPIC) 0.1 % external ointment    triamcinolone (KENALOG) 0.1 % external ointment    ustekinumab (STELARA) 90 MG/ML    valACYclovir (VALTREX) 500 MG tablet     No current facility-administered medications for this visit.      Past Medical/Surgical History:   Patient Active Problem List   Diagnosis    Psoriasis    Lateral Epicondylitis    Trochanteric Bursitis    Limb Pain    Cherry angioma    Multiple benign nevi    Solar lentigo    Seborrheic keratoses     History reviewed. No pertinent past medical history.

## 2024-03-18 ENCOUNTER — TELEPHONE (OUTPATIENT)
Dept: DERMATOLOGY | Facility: CLINIC | Age: 59
End: 2024-03-18
Payer: COMMERCIAL

## 2024-03-18 LAB
GAMMA INTERFERON BACKGROUND BLD IA-ACNC: 0.03 IU/ML
M TB IFN-G BLD-IMP: NEGATIVE
M TB IFN-G CD4+ BCKGRND COR BLD-ACNC: 9.97 IU/ML
MITOGEN IGNF BCKGRD COR BLD-ACNC: 0 IU/ML
MITOGEN IGNF BCKGRD COR BLD-ACNC: 0.01 IU/ML
QUANTIFERON MITOGEN: 10 IU/ML
QUANTIFERON NIL TUBE: 0.03 IU/ML
QUANTIFERON TB1 TUBE: 0.03 IU/ML
QUANTIFERON TB2 TUBE: 0.04

## 2024-03-18 NOTE — LETTER
Date: April 12, 2024  [URGENT] ATTN: Stanley Yin                                      Re: Prior Authorization Request   Plan: Extreme DA                                                  Patient: Eulalia Mendez  Member ID: [id number]                        YOB: 1965    To whom it may concern:     I am contacting you as a dermatologist caring for Eulalia Mendez with regard to the patient s diagnosis of Psoriasis (L40.9).    I recently prescribed this patient Otezla, which required a prior authorization. The prior authorization was denied due to my patient also being on another biologic medication, and the patient was unable to fill their prescription. I have reviewed the patient s diagnosis, care plan and clinical guidelines for treatment and request a formal appeal of your denial for Otezla.    When treating a patient with Psoriasis (L40.9) it is necessary to have access to the full spectrum of accepted treatments as patients may not be able to use one particular treatment due to lack of response, the potential for side effects or even an allergic reaction. It can become a serious safety issue for the patient if I am not able to prescribe a wide variety of treatments for this condition.    Eulalia has also exhausted the use of many topical and systemic treatments, including many classes of biologic therapy without adequate effect or significant side effects including:   - Cosentyx (secukinumab) (numerous intolerable side effects such as shortness of breath, aphthous ulcers, thrush and itching)  - Methotrexate (did not help with psoriasis)  - Humira (adalimumab) (lost efficacy after prolonged duration of use)   - Enbrel (etanercept) (lost efficacy after a prolonged duration of use)  - various topical corticosteroids (including halobetasol, betamethasone, and clobetasol)  - calcipotriene  - Eucrisa (helpful but too expensive)  - NBUVB (hand unit at home but flares condition)     I strongly believe  Eulalia needs access to Otezla in combination with Stelara (ustekinumab), her current biologic medication.  Eulalia has achieved efficacy in her psoriasis and rheumatoid arthritis symptoms with Stelara (ustekinumab) however is seeing breakthrough psoriasis symptoms shortly before next dose is due.  As you know, Otezla is FDA approved and effective for psoriasis and psoriatic arthritis, and monotherapy with biologic agents is effective for many patients with psoriasis some patients are not satisfied by the outcome and require combination therapy with other medications including Otezla (apremilast).  There has been several peer reviewed articles and systemic reviews detailing the safety and efficacy of the use of Otezla (apremilast) in combination with other biologic medications for the treatment of psoriasis.       Additionally, I request that you review the following evidence showing how this medication can be safely and effectively utilized in combination with other biologic medications to treat refractory Psoriasis (L40.9):     Moi S, Raudel BECKER, Diogenes R, Yari T, Taina C, Cristina A. Combination Therapy of Apremilast and Biologic Agent as a Safe Option of Psoriatic Arthritis and Psoriasis. Curr Rheumatol Rev. 2019;15(3):234-237. doi: 10.2174/6371982929541539243699758. PMID: 05707019.  Ashley M, Mike F, Flavio C, Didi L, Geetha A. Combination Therapy with Apremilast and Biologics for Psoriasis: A Systematic Review. Am J Clin Dermatol. 2022 Sep;23(5):605-613. doi: 10.1007/x46155-023-36956-3. Epub 2022 Jun 23. PMID: 38801007.  Takamura S, Sugai S, Taguchi R, Teraki Y. Combination therapy of apremilast and biologics in patients with psoriasis showing biologic fatigue. J Dermatol. 2020 Mar;47(3):290-294. doi: 10.1111/7177-8968.75138. Epub 2019 Dec 22. PMID: 39482436.  Westley MATOS, Karan S, Lacie N. Use of Apremilast in Combination With Other Therapies for Treatment of Chronic Plaque Psoriasis: A  Retrospective Study. J Cutan Med Surg. 2016 Jul;20(4):313-6. doi: 10.1177/4739760598914064. Epub 2016 Feb 4. PMID: 07738714.    On behalf of Eulalia, I would appreciate your prompt reconsideration of this denial. Please feel free to contact me at  for any additional information you may require. I look forward to receiving your response and approval of coverage for this medication.     Sincerely,    Margarita Brandt PA-C    Office contact:   Daphney Guzman   Pharmacy Liaison Dermatology   P: [phone]   F: [phone]   [email or web address]

## 2024-03-18 NOTE — TELEPHONE ENCOUNTER
PA Initiation    Medication: OTEZLA 10 & 20 & 30 MG PO TBPK  Insurance Company: [employer] - Phone [phone] Fax [phone]  Pharmacy Filling the Rx: [facility] - [place], [place] - [facility] [address]  Filling Pharmacy Phone:    Filling Pharmacy Fax:    Start Date: 3/18/2024      Key: [id number]

## 2024-03-21 NOTE — PROGRESS NOTES
Medication Therapy Management (MTM) Encounter    ASSESSMENT:                            Medication Adherence/Access: No issues identified.    Pustular psoriasis: Provided education on Sotyktu (deucravacitinib) today including dosing, general administration, side effects (both common/serious), precautions, monitoring and time to efficacy. Encouraged indicated non-live vaccines and avoidance of live vaccines. Discussed potential need to hold therapy in the setting of signs/symptoms of active infection. Encouraged patient to contact the Dermatology clinic in the event they have questions. Would benefit from starting Sotyktu (deucravacitinib) once it arrives.    Would benefit from collecting the following lab-work prior to medication initiation: Hepatitis B Surface Antibody, Hepatitis B Core Antibody, Hepatitis B Surface Antigen, Hepatitis C Antibody, and HIV Antigen Antibody.    Of note, would also benefit from verifying comfort of Margarita Brandt PA-C to have Sotyktu started in addition to Stelara if approved due to compounding potential for immunomodulation. Since referral came in with note that would be okay with this dual treatment, okay to continue with coverage assessment at this time.     Hypertension: Defer to PCP for continued management.    Hyperlipidemia:  Defer to PCP for continued management; reasonable to reassess patients muscle pain at future visit.     Due to lack of time or patient preference, was not able to review full medication list today; would benefit from assessing full medication list for accuracy, indication, effect, safety, and adherence at a future visit. Was able to complete medication list reconciliation today.    PLAN:                            Order for Sotyktu (deucravacitinib) sent to [facility] to be assessed for insurance coverage.   Requested verification from Margarita Brandt PA-C that appropriate to add Sotyktu to Stelara due to compounding immunomodulation risk.  Awaiting response.   Consider receiving the following vaccinations: pneumonia (PCV-20 preferred) and shingles (Shingrix; 2 dose-series).   Pended pre-biologic lab work to be completed at future in person visit.     Follow-up: Dr. Tari zayas on 6/14/24    SUBJECTIVE/OBJECTIVE:                          Eulalia Mendez is a 58 year old female called for an initial visit. She was referred to me from Margarita Brandt PA-C.      Reason for visit: Sotyktu (deucravacitinib) vs Otezla (apremilast).    Allergies/ADRs: Reviewed in chart and with patient.  Past Medical History: Reviewed in chart.  Tobacco: She reports that she has been smoking. She has never used smokeless tobacco. Nicotine/Tobacco Cessation Plan: Self help information given to patient; she is currently reducing use (at 0.5 ppd). Most helpful change was not smoking at work.  Alcohol: a couple glasses of wine 3-4 days per week.    Medication Adherence/Access: no issues reported.    Pustular psoriasis:   - Stelara (ustekinumab) 90 mg every 12 weeks.   - Betamethasone 0.05% cream once daily.   - Clobetasole 0.05% ointment twice daily 4 days per week.     3/22/24: Patient presents with a preference to explore Sotyktu coverage prior to initiating Otezla due to concerns regarding potential gastrointestinal adverse effects associated with Otezla. Reports fluctuating between diarrhea and constipation, finding relief through probiotic drinks and avoidance of NSAIDs. Exhibits flares primarily localized underneath nailbeds, fingertips, hands, bottom of feet, with occasional involvement on legs, trunk, neck, and face. Severe pain in feet, particularly between toes, impedes mobility. Initiated Stelara therapy at [facility] in 2015.  Identifies a cyclic pattern where flares subside during the initial two post-Stelara injection (~80% improvement per patient), then resurge during the third month prior to giving next injection. Patient has already signed up for copay assistance  card.     Specialist: Margarita Brandt PA-C, Dermatology. Last visit on 3/15/24. The following was recommended:   # Pustular psoriasis of palms and soles   Chronic problem. Well-controlled on Stelara in general, but does start to get flares again about 3 weeks prior to next injection. She tries to manage with topicals during this time, but it now needing clobetasol daily during this period. Because patient is not well controlled we need to add something else to help her. she has tried and failed numerous other medications (see above). Light therapy makes this problem worse for her, but she does have a hand unit at home.   - Labs ordered - CBC, CMP and TB gold  - Continue Stelara 90 mg/ml every 3 months; refilled today - has tried to get insurance approval for stelara injections every 2mo, but this has been denied  - Ordered Otezla to take 30mg po BID, If approved by insurance, if not will have to try TYK #2 - Deucravacitinib - patient preference is the Deucravacitinib because of the once daily dosing but I explained to her that unfortunately insurance may require a trial and failure of otezla before it will cover this medication.  -Discussed with patient risks and benefits of Apremilast. Discussed risks including GI upset, nausea, vomiting, diarrhea, depression, weight loss, headaches, and URI. The patient denies a history of depression, current changes in mood or suicidal ideation. The patient will contact clinic if they experience any changes in mood.   -Patient is not pregnant or breastfeeding  -Medication list was reviewed for drug interactions(including CYP-450 inducers) and none were noted.   -The patient was instructed not to crush, chew or split the tablet and that the tab may be taken with or without food.   -Dosing will be as follows: Day 1: 10 mg in morning, Day 2: 10 mg in morning and 10 mg in evening, Day 3: 10 mg in morning and 20 mg in evening, Day 4: 20 mg in morning and 20 mg in evening, Day 5: 20 mg  in morning and 30 mg in evening, Day 6 and thereafter: 30 mg twice daily.   - Continue clobetasol 0.05% ointment BID 4 days/week; refilled today.  - smoking cessation recommended.  - ordered a medication management referral  Follow-up: 6 month(s) in-person, or earlier for new or changing lesions    Previous treatment:   - Cosenyx: no effect.    Pre-Biologic Screenings      Hep B Surface Antibody No record of completion    Hep B Core Antibody  No record of completion    Hep B Surface Antigen No record of completion    Hep C Antibody  Non-reactive (9/8/20; [facility])    HIV Antigen Antibody No record of completion    Quantiferon TB Gold Negative (3/15/24)    CBC 3/15/24   CMP 3/15/24 (eGFR 87 mL/min)     Immunization History   Covid-19 vaccine (8718-3918 version)  Patient reported she did receive   Influenza (annual, 4259-9495) Declines wanting to receive; used to get very sick from flu vaccines.   Tetanus/Tdap Up-to-date   Pneumococcal Due to receive, PCV-20 preferred   Shingrix Due to receive   Hepatitis B No serologies to assess at this time   All patients on biologics should avoid live vaccines (varicella/VZV, intranasal influenza, MMR, or yellow fever vaccine (if traveling))       Hypertension:   - Hydrochlorothiazide 25 mg once daily.  - Losartan 100 mg once daily.     Patient reports no current medication side effects (denies orthostatic hypotension, dizziness, fatigue, urinary frequency). Patient does not self-monitor blood pressure; she notes in-clinic BP monitoring usually is 136/80 mmHg. No longer taking metoprolol  once daily (removed from list).    Hyperlipidemia:   - Rosuvastatin 20 mg once daily.     Patient reports the following medication side effects: frequent cramping/sore legs. Does note it improves if she eats a banana.  ----------------  I spent 50 minutes with this patient today. All changes were made via collaborative practice agreement with Margarita Brandt PA-C. A copy of the visit note was  provided to the patient's provider(s).    A summary of these recommendations was sent via [employer].    Leda Sanchez, Pharm.D.  Medication Therapy Management Pharmacist   [facility] Dermatology    Telemedicine Visit Details  Type of service:  Telephone visit  Start Time: 11:00 AM  End Time: 11:50 AM     Medication Therapy Recommendations  No medication therapy recommendations to display

## 2024-03-21 NOTE — TELEPHONE ENCOUNTER
PRIOR AUTHORIZATION DENIED    Medication: OTEZLA 10 & 20 & 30 MG PO TBPK  Insurance Company: [employer] - Phone [phone] Fax [phone]  Denial Date: 3/21/2024  Denial Reason(s):     Appeal Information: [phone]  Patient Notified:

## 2024-03-22 ENCOUNTER — VIRTUAL VISIT (OUTPATIENT)
Dept: RHEUMATOLOGY | Facility: CLINIC | Age: 59
End: 2024-03-22
Attending: PHYSICIAN ASSISTANT
Payer: COMMERCIAL

## 2024-03-22 DIAGNOSIS — L40.8 OTHER PSORIASIS: Primary | ICD-10-CM

## 2024-03-22 DIAGNOSIS — L40.3 PUSTULAR PSORIASIS OF PALMS AND SOLES: ICD-10-CM

## 2024-03-22 DIAGNOSIS — E78.5 HYPERLIPIDEMIA: ICD-10-CM

## 2024-03-22 DIAGNOSIS — I10 HYPERTENSION: ICD-10-CM

## 2024-03-22 RX ORDER — ROSUVASTATIN CALCIUM 20 MG/1
1 TABLET, COATED ORAL AT BEDTIME
COMMUNITY
Start: 2023-07-24

## 2024-03-22 RX ORDER — ESOMEPRAZOLE MAGNESIUM 40 MG/1
40 CAPSULE, DELAYED RELEASE ORAL
COMMUNITY

## 2024-03-22 RX ORDER — SIMETHICONE 125 MG
125 TABLET,CHEWABLE ORAL DAILY PRN
COMMUNITY

## 2024-03-22 RX ORDER — LORAZEPAM 1 MG/1
.5-1 TABLET ORAL
COMMUNITY
Start: 2024-02-28

## 2024-03-22 RX ORDER — TRAMADOL HYDROCHLORIDE 50 MG/1
50 TABLET ORAL 2 TIMES DAILY
COMMUNITY
Start: 2024-02-07

## 2024-03-22 RX ORDER — PRAZOSIN HYDROCHLORIDE 2 MG/1
1 CAPSULE ORAL AT BEDTIME
COMMUNITY
Start: 2023-08-16

## 2024-03-22 RX ORDER — FLUTICASONE PROPIONATE 50 MCG
2 SPRAY, SUSPENSION (ML) NASAL DAILY
COMMUNITY
Start: 2022-12-30

## 2024-03-22 RX ORDER — HYDROCHLOROTHIAZIDE 25 MG/1
25 TABLET ORAL DAILY
COMMUNITY
Start: 2023-08-08

## 2024-03-22 NOTE — Clinical Note
3/22/2024       RE: Eulalia Mendez  [address] [facility] [address]     Dear Colleague,    Thank you for referring your patient, Eulalia Mendez, to the [facility] [place] at [facility]. Please see a copy of my visit note below.    Medication Therapy Management (MTM) Encounter    ASSESSMENT:                            Medication Adherence/Access: {adherencechoices:496939}.    ***: Provided education on *** today including dosing, general administration, side effects (both common/serious), precautions, monitoring and time to efficacy. {Immunosuppresing?:289133} Encouraged patient to contact the Dermatology clinic in the event they have questions. Would benefit from starting *** once it arrives.    She would prefer Sotyctu. Nausea. Denies wants.         PLAN:                            Order for *** sent to *** to be assessed for insurance coverage.   Consider receiving the following vaccinations: {Vaccines:616853}.     Follow-up: ***    SUBJECTIVE/OBJECTIVE:                          Eulalia Mendez is a 58 year old female called for an initial visit. She was referred to me from Margarita Brandt PA-C. {[facility]:509596}     Reason for visit: ***.    Allergies/ADRs: Reviewed in chart ***.  Past Medical History: Reviewed in chart.  Tobacco: She reports that she has been smoking. She has never used smokeless tobacco.Nicotine/Tobacco Cessation Plan  {Nicotine/Tobacco Cessation Plan:909673}  0.5 pack per day. Reduced. Not at work.  Alcohol: a couple glasses of wine 3-4 days per week.    Medication Adherence/Access: {fumedadherence:933102}.    Pustular psoriasis:   ***    GI effects: daily (no ibuprofen or motirn or anything because stomach bleed) upset stomach or bowels - flutuates between diarrhe and constipation - has been using probiotoc drinks 20b helpful good belly.    Patient would like to try Sotyktu (deucravacitinib) first. Pustules  undernieth nail beds and figertips and whole hands and bottom of feet, also leg sporiasis on legs and back. Also puspteson neck and face twice yearly. Hurt very bad - between toes, difficutl to walk - started 2015 stelara; [facility] - she thought about switchign from telara over to     Third month is when symptoms come back. Can keep at bay with diet for first 2 month. Huge 80 % improvement. Still gets them but now its the third msucle (dries them up the first 2 motnhs; then act up the 3rd month again. Ebbe and flow.     Specialist: Margarita Brandt PA-C, Dermatology. Last visit on 3/15/24. The following was recommended:   # Pustular psoriasis of palms and soles   Chronic problem. Well-controlled on Stelara in general, but does start to get flares again about 3 weeks prior to next injection. She tries to manage with topicals during this time, but it now needing clobetasol daily during this period. Because patient is not well controlled we need to add something else to help her. she has tried and failed numerous other medications (see above). Light therapy makes this problem worse for her, but she does have a hand unit at home.   - Labs ordered - CBC, CMP and TB gold  - Continue Stelara 90 mg/ml every 3 months; refilled today - has tried to get insurance approval for stelara injections every 2mo, but this has been denied  - Ordered Otezla to take 30mg po BID, If approved by insurance, if not will have to try TYK #2 - Deucravacitinib - patient preference is the Deucravacitinib because of the once daily dosing but I explained to her that unfortunately insurance may require a trial and failure of otezla before it will cover this medication.  -Discussed with patient risks and benefits of Apremilast. Discussed risks including GI upset, nausea, vomiting, diarrhea, depression, weight loss, headaches, and URI. The patient denies a history of depression, current changes in mood or suicidal ideation. The patient will contact  clinic if they experience any changes in mood.   -Patient is not pregnant or breastfeeding  -Medication list was reviewed for drug interactions(including CYP-450 inducers) and none were noted.   -The patient was instructed not to crush, chew or split the tablet and that the tab may be taken with or without food.   -Dosing will be as follows: Day 1: 10 mg in morning, Day 2: 10 mg in morning and 10 mg in evening, Day 3: 10 mg in morning and 20 mg in evening, Day 4: 20 mg in morning and 20 mg in evening, Day 5: 20 mg in morning and 30 mg in evening, Day 6 and thereafter: 30 mg twice daily.   - Continue clobetasol 0.05% ointment BID 4 days/week; refilled today.  - smoking cessation recommended.  - ordered a medication management referral  Follow-up: 6 month(s) in-person, or earlier for new or changing lesions    Previous treatment:   - Cosenyx: Didn't work    Pre-Biologic Screenings      Hep B Surface Antibody {DERM RESULTS:723723} (***)    Hep B Core Antibody  {DERM RESULTS:371289} (***)    Hep B Surface Antigen {DERM RESULTS:794060} (***)    Hep C Antibody  {DERM RESULTS:321633} (***)    HIV Antigen Antibody {DERM RESULTS:130127} (***)    Quantiferon TB Gold Negative (3/15/24)    CBC 3/15/24   CMP 3/15/24 (eGFR 87 mL/min)     Immunization History   Covid-19 vaccine (6388-7532 version)  {Status:796846} - patient reported she got   Influenza (annual, 6842-4742) {Status:268945}, avoid live FluMist - used to get very sick from them   Tetanus/Tdap Up-to-date   Pneumococcal  Prevnar-13: ***  Pneumovax-23: ***   Prevnar-20: *** {Status:286769}   Shingrix {Status:372655}   Hepatitis B {Status:749883}   RSV (only for ? 60 years old)  {Status:321811}   All patients on biologics should avoid live vaccines (varicella/VZV, intranasal influenza, MMR, or yellow fever vaccine (if traveling))       Hypertension:   ***  Patient reports { :247015}.  Patient does not self-monitors blood pressure. In clinic BP monitoring usually 136/80  "mmHg.      No longer taking metoprolol  once daily (removed from list).    BP Readings from Last 3 Encounters:   No data found for BP     Pulse Readings from Last 3 Encounters:   No data found for Pulse     Today's Vitals: There were no vitals taken for this visit.      No longer taking ranitidine 150 mg twice daily.  No longer taking sucralfate 1 gm once daily per patient.   No longer taking clonazepam 1 mg tablet; takes ***.    Hypertension:   ***  Patient reports { :088335}.  Patient {HTNDoes/doesnot:304234}.     Dry mouth (yes from tramadol and hydrocortisone and sertraline), orthostatic hypotension, dizziness, fatigue. Urinary frequency. No.     Betamethasone 0.025% cream     - Baclofen 10 mg 2 at night every night plus one during the day as needed..  - Betamethasone 0.05% cream.   - Clobetasole 0.05% ointment.  - Esomeprazole 40 mg capsule once daily.  - Flonase nasal spray  - hydrochlorothiazide 25 mg once daily.  - Hydrocodone-acetaminophen 5-325 - about .  - Pdtltvwcyth6u 150 mcg once daily.  - Lorazepam 1 mg tablet.   - Losartan 100 mg once daily.   - Prazosin 2 mg capsule once daily.   - Rosuvastatin 20 mg once daily - frequent cramping - sore legs; if she eats a banana it may helps.   - Sertraline 100  mg once daily.   - Tramadol 50 mg twice daily.   - Stelara (ustekinumab) 90 mg every 12 weeks.     - once every 2 weeks for simethicone.   - hydroxyzine 25 mg but does have enough to use as needed.     ----------------  {DIANE?:880435}    I spent *** minutes with this patient today. All changes were made via collaborative practice agreement with {Dermatology Providers:050577}. A copy of the visit note was provided to the patient's provider(s).    A summary of these recommendations was sent via [employer].    Leda Sanchez, Pharm.D.  Medication Therapy Management Pharmacist   [facility] Dermat[facility]    Telemedicine Visit Details  Type of service:  {[facility]visit[facility]:085924::\"Telephone visit\"}  Start " Time: {video/phone visit start time:152948}  End Time: {video/phone visit end time:152948}     Medication Therapy Recommendations  No medication therapy recommendations to display        Again, thank you for allowing me to participate in the care of your patient.      Sincerely,    Leda Sanchez, [facility]

## 2024-03-22 NOTE — Clinical Note
Kenroy Avila!   I met with Eulalia and she preferred to try for Sotyktu coverage prior to trying for Otezla due to wanting to avoid potential for GI side effects. Its worth a try, although we haven't had great luck with this coverage yet for other patients. I wanted to quick double check that its okay for us to add Sotyktu to Stelara despite both having some immunomodulation effects. Also, not worried about urgency in this but I will pend some of the pre-biologic labwork that we haven't grabbed yet to get in the future. Other options could be to try another biologic with closer dosing (although she did not have effect from Cosentyx previously, and I know she is happy with her ~80% improvement on Stelara). Just want to at least verify this is safe as I haven;t done a lot of combinations out side of biologic + otezla. Thanks :)

## 2024-03-29 RX ORDER — DEUCRAVACITINIB 6 MG/1
1 TABLET, FILM COATED ORAL DAILY
Qty: 30 TABLET | Refills: 5 | Status: SHIPPED | OUTPATIENT
Start: 2024-03-29

## 2024-04-01 ENCOUNTER — TELEPHONE (OUTPATIENT)
Dept: DERMATOLOGY | Facility: CLINIC | Age: 59
End: 2024-04-01

## 2024-04-01 NOTE — TELEPHONE ENCOUNTER
PA Initiation    Medication: SOTYKTU 6 MG PO TABS  Insurance Company: [employer] - Phone [phone] Fax [phone]  Pharmacy Filling the Rx: [facility] - [place], [place] - [facility] [address]  Filling Pharmacy Phone:    Filling Pharmacy Fax:    Start Date: 4/1/2024    Key: [id number]

## 2024-04-02 NOTE — PATIENT INSTRUCTIONS
"Recommendations from today's MTM visit:                                                    MTM (medication therapy management) is a service provided by a clinical pharmacist designed to help you get the most of out of your medicines.      Order for Sotyktu (deucravacitinib) sent to be considered by insurance.  Consider receiving the following vaccinations: pneumonia (PCV-20 preferred) and shingles (Shingrix; 2 dose-series).   There are a few lab tests we could run for safety purposes that I have pended in your chart to complete at your next visit. No need to come in early for these; we can just grab them next time you're in the clinic.     Follow-up: As needed via Allerg[employer]aset regarding coverage/filling status, and again once starting the medication (between the pharmacy and myself, we will check in at 7 days, 1 month, and 3 months). Then going forward we will touch base approximately every 6 months.  Appt with Dr. Marc on 6/14/24.    It was great speaking with you today.  I value your experience and would be very thankful for your time in providing feedback in our clinic survey. In the next few days, you may receive an email or text message from [employer] with a link to a survey related to your  clinical pharmacist.\"     To schedule another MTM appointment, please call the clinic directly or you may call the MTM scheduling line at [phone].    My Clinical Pharmacist's contact information:                                                      Please feel free to contact me with any questions or concerns you have.      Leda Sanchez, Pharm.D.  Medication Therapy Management Pharmacist   [facility]  MTM Scheduling Line: [phone]    "

## 2024-04-04 NOTE — TELEPHONE ENCOUNTER
PRIOR AUTHORIZATION DENIED    Medication: SOTYKTU 6 MG PO TABS  Insurance Company: [employer] - Phone [phone] Fax [phone]  Denial Date: 4/3/2024  Denial Reason(s):   Appeal Information: [phone]  Patient Notified:

## 2024-04-12 NOTE — TELEPHONE ENCOUNTER
MTM drafted appeal for Otezla (apremilast) and routed to pharmacy liaison to submit to insurance    Amy Coburn, ChikisD, BCPPS  Medication Therapy Management Pharmacist  [facility]

## 2024-04-15 NOTE — TELEPHONE ENCOUNTER
Medication Appeal Initiation    Medication: OTEZLA 10 & 20 & 30 MG PO TBPK  Appeal Start Date:  4/15/2024  Insurance Company: [employer]  Insurance Phone: [phone]  Insurance Fax: [phone]  Comments:   faxed appeal

## 2024-04-18 NOTE — TELEPHONE ENCOUNTER
Letao is requiring signed consent form in order for us to fill. Sent message to patient to see if we can email it over will check back in a few days

## 2024-04-23 NOTE — TELEPHONE ENCOUNTER
Received emailed from patient with consent form but was missing a signature on the second page. Emailed patient to sign page 2. Will check back in a few days for an update

## 2024-04-24 NOTE — TELEPHONE ENCOUNTER
Received completed consent form from patient. Faxed over to [employer] for appeal. Will check for another update in a few days

## 2024-04-29 NOTE — TELEPHONE ENCOUNTER
Received notification that appeal is pending with health partners. Will check back in a few days for another update.

## 2024-05-02 NOTE — TELEPHONE ENCOUNTER
Left message for [employer] to check on the status of appeal. Will check back in a few days for another update

## 2024-05-03 NOTE — TELEPHONE ENCOUNTER
Spoke with [employer] and appeal is still being. Appeal will be completed no later then 5/8. Will check back next week for another update if we dont hear back

## 2024-05-07 ENCOUNTER — TELEPHONE (OUTPATIENT)
Dept: DERMATOLOGY | Facility: CLINIC | Age: 59
End: 2024-05-07
Payer: COMMERCIAL

## 2024-05-07 NOTE — TELEPHONE ENCOUNTER
PA Initiation    Medication[facility] 90 [facility]  Insurance Company: [employer] - Phone [phone] Fax [phone]  Pharmacy Filling the Rx: [facility] - [place], [place] - [address] [place] [address]  Filling Pharmacy Phone:    Filling Pharmacy Fax:    Start Date: 5/7/2024      Key: [id number]

## 2024-05-09 NOTE — TELEPHONE ENCOUNTER
Left message for [employer] to check on appeal and if determination was made to have it faxed to us. Will check back again in a few days

## 2024-05-10 ENCOUNTER — MYC MEDICAL ADVICE (OUTPATIENT)
Dept: PHARMACY | Facility: CLINIC | Age: 59
End: 2024-05-10
Payer: COMMERCIAL

## 2024-05-10 NOTE — TELEPHONE ENCOUNTER
Sent my chart message to patient to inform of denial and see if interested in free drug program will check back again next week

## 2024-05-10 NOTE — TELEPHONE ENCOUNTER
MEDICATION APPEAL DENIED    Medication: OTEZLA 10 & 20 & 30 MG PO TBPK  Insurance Company: [employer]  Denial Date: 5/6/2024  Denial Reason(s):   Second Level Appeal Information:external review, patient has to pay filling fee of $25.00

## 2024-05-13 NOTE — TELEPHONE ENCOUNTER
Prior Authorization Approval    Medication: STELARA 90 MG/ML SC SOSY  Authorization Effective Date: 2/4/2024  Authorization Expiration Date: 3/1/2025  Approved Dose/Quantity: 1 for 84 days  Reference #: Key: [id number]   Insurance Company: [employer] - Phone [phone] Fax [phone]  Expected CoPay: $    CoPay Card Available: No    Financial Assistance Needed:   Which Pharmacy is filling the prescription: [facility] - [place], [place] - [address] [place] [address]  Pharmacy Notified: yes  Patient Notified: not needed

## 2024-05-14 NOTE — TELEPHONE ENCOUNTER
Sent another message to patient to see if she is interested in applying for free drug. Will check back again in a few days

## 2024-06-13 ENCOUNTER — HOSPITAL ENCOUNTER (EMERGENCY)
Facility: CLINIC | Age: 59
Discharge: HOME | End: 2024-06-13
Attending: STUDENT IN AN ORGANIZED HEALTH CARE EDUCATION/TRAINING PROGRAM | Admitting: STUDENT IN AN ORGANIZED HEALTH CARE EDUCATION/TRAINING PROGRAM
Payer: COMMERCIAL

## 2024-06-13 ENCOUNTER — APPOINTMENT (OUTPATIENT)
Dept: CT IMAGING | Facility: CLINIC | Age: 59
End: 2024-06-13
Attending: STUDENT IN AN ORGANIZED HEALTH CARE EDUCATION/TRAINING PROGRAM
Payer: COMMERCIAL

## 2024-06-13 VITALS
RESPIRATION RATE: 18 BRPM | SYSTOLIC BLOOD PRESSURE: 125 MMHG | WEIGHT: 169 LBS | BODY MASS INDEX: 28.16 KG/M2 | DIASTOLIC BLOOD PRESSURE: 70 MMHG | TEMPERATURE: 98.5 F | OXYGEN SATURATION: 91 % | HEART RATE: 77 BPM | HEIGHT: 65 IN

## 2024-06-13 DIAGNOSIS — G24.3 SPASMODIC TORTICOLLIS: ICD-10-CM

## 2024-06-13 PROCEDURE — 96376 TX/PRO/DX INJ SAME DRUG ADON: CPT

## 2024-06-13 PROCEDURE — 70450 CT HEAD/BRAIN W/O DYE: CPT

## 2024-06-13 PROCEDURE — 72125 CT NECK SPINE W/O DYE: CPT

## 2024-06-13 PROCEDURE — 96375 TX/PRO/DX INJ NEW DRUG ADDON: CPT

## 2024-06-13 PROCEDURE — 99285 EMERGENCY DEPT VISIT HI MDM: CPT | Mod: 25

## 2024-06-13 PROCEDURE — 250N000013 HC RX MED GY IP 250 OP 250 PS 637: Performed by: STUDENT IN AN ORGANIZED HEALTH CARE EDUCATION/TRAINING PROGRAM

## 2024-06-13 PROCEDURE — 96374 THER/PROPH/DIAG INJ IV PUSH: CPT

## 2024-06-13 PROCEDURE — 250N000011 HC RX IP 250 OP 636: Mod: JZ | Performed by: STUDENT IN AN ORGANIZED HEALTH CARE EDUCATION/TRAINING PROGRAM

## 2024-06-13 RX ORDER — METHOCARBAMOL 750 MG/1
750 TABLET, FILM COATED ORAL 4 TIMES DAILY PRN
Qty: 20 TABLET | Refills: 0 | Status: SHIPPED | OUTPATIENT
Start: 2024-06-13

## 2024-06-13 RX ORDER — DIAZEPAM 5 MG
5 TABLET ORAL ONCE
Status: COMPLETED | OUTPATIENT
Start: 2024-06-13 | End: 2024-06-13

## 2024-06-13 RX ORDER — KETOROLAC TROMETHAMINE 15 MG/ML
15 INJECTION, SOLUTION INTRAMUSCULAR; INTRAVENOUS ONCE
Status: COMPLETED | OUTPATIENT
Start: 2024-06-13 | End: 2024-06-13

## 2024-06-13 RX ORDER — DIAZEPAM 5 MG
5 TABLET ORAL EVERY 8 HOURS PRN
Qty: 10 TABLET | Refills: 0 | Status: SHIPPED | OUTPATIENT
Start: 2024-06-13 | End: 2024-06-17

## 2024-06-13 RX ADMIN — HYDROMORPHONE HYDROCHLORIDE 1 MG: 1 INJECTION, SOLUTION INTRAMUSCULAR; INTRAVENOUS; SUBCUTANEOUS at 21:50

## 2024-06-13 RX ADMIN — HYDROMORPHONE HYDROCHLORIDE 1 MG: 1 INJECTION, SOLUTION INTRAMUSCULAR; INTRAVENOUS; SUBCUTANEOUS at 21:10

## 2024-06-13 RX ADMIN — DIAZEPAM 5 MG: 5 TABLET ORAL at 21:08

## 2024-06-13 RX ADMIN — DIAZEPAM 5 MG: 5 TABLET ORAL at 21:50

## 2024-06-13 RX ADMIN — KETOROLAC TROMETHAMINE 15 MG: 15 INJECTION, SOLUTION INTRAMUSCULAR; INTRAVENOUS at 21:50

## 2024-06-13 ASSESSMENT — COLUMBIA-SUICIDE SEVERITY RATING SCALE - C-SSRS
2. HAVE YOU ACTUALLY HAD ANY THOUGHTS OF KILLING YOURSELF IN THE PAST MONTH?: NO
6. HAVE YOU EVER DONE ANYTHING, STARTED TO DO ANYTHING, OR PREPARED TO DO ANYTHING TO END YOUR LIFE?: NO
1. IN THE PAST MONTH, HAVE YOU WISHED YOU WERE DEAD OR WISHED YOU COULD GO TO SLEEP AND NOT WAKE UP?: NO

## 2024-06-13 ASSESSMENT — ACTIVITIES OF DAILY LIVING (ADL)
ADLS_ACUITY_SCORE: 35
ADLS_ACUITY_SCORE: 33
ADLS_ACUITY_SCORE: 35

## 2024-06-14 NOTE — ED PROVIDER NOTES
EMERGENCY DEPARTMENT ENCOUNTER       ED Course & Medical Decision Making     Final Impression  59 year old female presents for evaluation of 2 days of severe neck pain and tightness, worse on the left.  Symptoms happened acutely when she was at work, simply turned her head and had acute onset pain and tightness of her neck musculature patient has history of some chronic pain, has tried her home Vicodin, tramadol, lorazepam, as well as a topical lidocaine patch if that is present on her neck upon arrival, though none of these have improved her symptoms much.  Patient has no other focal neurologic deficits, patient ambulatory, good strength in bilateral upper extremities.  No rashes, warmth, or erythema of the neck will be indicative of infection.  Patient given IV Dilaudid and oral Valium, had some mild improvement, though still not able to range neck much.  These medications were redosed and had more dramatic improvement.  CT of head and cervical spine unremarkable, no acute traumatic abnormalities.  Patient reevaluated, has much better range of motion of neck, states that she feels much better.  Will discharge home with short prescriptions for Robaxin and Valium to help with suspected torticollis.   is present with her, will drive her home.    Prior to making a final disposition on this patient the results of patient's tests and other diagnostic studies were discussed with the patient. All questions were answered. Patient expressed understanding of the plan and was amenable to it.    Medical Decision Making    History:  Supplemental history from:   External Record(s) reviewed as documented below;  5/30/2024, [facility] clinic note, seen for cough, sinus drainage, discussed her chronic pain and medications for it, as well as anxiety with [facility] history reviewed, shows fairly regular refills for hydrocodone, lorazepam, tramadol    Work  "Up:  Chart documentation includes differential considered and any EKGs or imaging independently interpreted by provider, where specified.  DDx considered but not limited to: Torticollis, cervical spine fracture, intracranial mass, intracranial bleeding    Complicating factors:  Care impacted by chronic illness: Hypertension and Mental Health, Chronic pain, rheumatoid arthritis  Care affected by social determinants of health: N/A    Disposition considerations: Discharge. I prescribed additional prescription strength medication(s) as charted. I considered admission, but discharged patient after significant clinical improvement.      Medications   HYDROmorphone (DILAUDID) injection 1 mg (1 mg Intravenous $Given 6/13/24 2110)   diazepam (VALIUM) tablet 5 mg (5 mg Oral $Given 6/13/24 2108)   HYDROmorphone (DILAUDID) injection 1 mg (1 mg Intravenous $Given 6/13/24 2150)   diazepam (VALIUM) tablet 5 mg (5 mg Oral $Given 6/13/24 2150)   ketorolac (TORADOL) injection 15 mg (15 mg Intravenous $Given 6/13/24 2150)       New Prescriptions    DIAZEPAM (VALIUM) 5 MG TABLET    Take 1 tablet (5 mg) by mouth every 8 hours as needed for muscle spasms    METHOCARBAMOL (ROBAXIN) 750 MG TABLET    Take 1 tablet (750 mg) by mouth 4 times daily as needed for muscle spasms     Modified Medications    No medications on file     Final Impression     1. Spasmodic torticollis      Chief Complaint     Chief Complaint   Patient presents with    Neck Pain     To ED per POV, accompanied by     C/o nontraumatic neck pain x 2 days, worse on L, unrelieved by 5mg vicodin/tramadol/lorazepam, and minimally relieved by pressure      HPI     Eulalia Mendez is a 59 year old female who presents for evaluation of     Physical Exam     /70   Pulse 77   Temp 98.5  F (36.9  C) (Oral)   Resp 18   Ht 1.651 m (5' 5\")   Wt 76.7 kg (169 lb)   SpO2 91%   BMI 28.12 kg/m    Constitutional: Awake, alert, appears extremely uncomfortable, holding her head " in a fixed position with both hands,  helping to hold her head in position of comfort as well  Head: Normocephalic, atraumatic.  Neck: Significant tightness in the trapezius bilateral neck musculature, slightly worse on the left.  Significant tenderness to any palpation, unable to range neck due to muscle spasm.  No midline tenderness or step-offs.  No rashes or warmth erythema.  ENT: Mucous membranes moist.  Eyes: Conjunctiva normal. PERRL  Respiratory: Respirations even, unlabored, in no acute respiratory distress.  Cardiovascular: Regular rate and rhythm. Good peripheral perfusion.  Musculoskeletal: Moves all 4 extremities equally.  Integument: Warm, dry.  Neurologic: Alert & oriented x 3. Normal speech. Grossly normal motor and sensory function. No focal deficits noted.  Psychiatric: Normal mood    Labs & Imaging     Imaging reviewed and independently interpreted as below;   CT head and cervical spine images reviewed, no acute fractures, no intracranial masses or bleeding.    Results for orders placed or performed during the hospital encounter of 06/13/24   CT Cervical Spine w/o Contrast    Impression    IMPRESSION:  HEAD CT:  1.  No acute intracranial process.  2.  Presumed chronic ischemic changes as detailed.    CERVICAL SPINE CT:  1.  No CT evidence for acute fracture or post traumatic subluxation.  2.  Spondylosis with spinal canal and foraminal stenoses as detailed.   Head CT w/o contrast    Impression    IMPRESSION:  HEAD CT:  1.  No acute intracranial process.  2.  Presumed chronic ischemic changes as detailed.    CERVICAL SPINE CT:  1.  No CT evidence for acute fracture or post traumatic subluxation.  2.  Spondylosis with spinal canal and foraminal stenoses as detailed.          Mauro Livingston MD  06/13/24 9137

## 2024-06-14 NOTE — ED TRIAGE NOTES
To ED per POV, accompanied by     C/o nontraumatic neck pain x 2 days, worse on L, unrelieved by 5mg vicodin/tramadol/lorazepam, and minimally relieved by pressure     Triage Assessment (Adult)       Row Name 06/13/24 1927          Triage Assessment    Airway WDL WDL        Respiratory WDL    Respiratory WDL WDL        Skin Circulation/Temperature WDL    Skin Circulation/Temperature WDL WDL        Cardiac WDL    Cardiac WDL WDL        Peripheral/Neurovascular WDL    Peripheral Neurovascular WDL WDL        Cognitive/Neuro/Behavioral WDL    Cognitive/Neuro/Behavioral WDL WDL

## 2024-06-17 ENCOUNTER — APPOINTMENT (OUTPATIENT)
Dept: RADIOLOGY | Facility: CLINIC | Age: 59
End: 2024-06-17
Attending: EMERGENCY MEDICINE
Payer: COMMERCIAL

## 2024-06-17 ENCOUNTER — HOSPITAL ENCOUNTER (EMERGENCY)
Facility: CLINIC | Age: 59
Discharge: HOME | End: 2024-06-17
Attending: EMERGENCY MEDICINE | Admitting: EMERGENCY MEDICINE
Payer: COMMERCIAL

## 2024-06-17 VITALS
TEMPERATURE: 97.2 F | HEIGHT: 65 IN | DIASTOLIC BLOOD PRESSURE: 72 MMHG | BODY MASS INDEX: 27.32 KG/M2 | HEART RATE: 72 BPM | WEIGHT: 164 LBS | OXYGEN SATURATION: 92 % | RESPIRATION RATE: 18 BRPM | SYSTOLIC BLOOD PRESSURE: 119 MMHG

## 2024-06-17 DIAGNOSIS — G24.3 SPASMODIC TORTICOLLIS: ICD-10-CM

## 2024-06-17 DIAGNOSIS — M62.838 TRAPEZIUS MUSCLE SPASM: ICD-10-CM

## 2024-06-17 PROCEDURE — 96375 TX/PRO/DX INJ NEW DRUG ADDON: CPT

## 2024-06-17 PROCEDURE — 71046 X-RAY EXAM CHEST 2 VIEWS: CPT

## 2024-06-17 PROCEDURE — 99284 EMERGENCY DEPT VISIT MOD MDM: CPT | Mod: 25

## 2024-06-17 PROCEDURE — 96374 THER/PROPH/DIAG INJ IV PUSH: CPT | Mod: 59

## 2024-06-17 PROCEDURE — 250N000012 HC RX MED GY IP 250 OP 636 PS 637: Performed by: EMERGENCY MEDICINE

## 2024-06-17 PROCEDURE — 250N000013 HC RX MED GY IP 250 OP 250 PS 637: Performed by: EMERGENCY MEDICINE

## 2024-06-17 PROCEDURE — 250N000011 HC RX IP 250 OP 636: Mod: JZ | Performed by: EMERGENCY MEDICINE

## 2024-06-17 PROCEDURE — 20552 NJX 1/MLT TRIGGER POINT 1/2: CPT

## 2024-06-17 RX ORDER — PREDNISONE 20 MG/1
40 TABLET ORAL DAILY
Qty: 8 TABLET | Refills: 0 | Status: SHIPPED | OUTPATIENT
Start: 2024-06-18 | End: 2024-06-22

## 2024-06-17 RX ORDER — DIAZEPAM 5 MG
5 TABLET ORAL ONCE
Status: COMPLETED | OUTPATIENT
Start: 2024-06-17 | End: 2024-06-17

## 2024-06-17 RX ORDER — DIAZEPAM 5 MG
5 TABLET ORAL EVERY 6 HOURS PRN
Qty: 8 TABLET | Refills: 0 | Status: SHIPPED | OUTPATIENT
Start: 2024-06-17

## 2024-06-17 RX ORDER — HYDROMORPHONE HYDROCHLORIDE 1 MG/ML
0.5 INJECTION, SOLUTION INTRAMUSCULAR; INTRAVENOUS; SUBCUTANEOUS ONCE
Status: COMPLETED | OUTPATIENT
Start: 2024-06-17 | End: 2024-06-17

## 2024-06-17 RX ORDER — KETOROLAC TROMETHAMINE 15 MG/ML
15 INJECTION, SOLUTION INTRAMUSCULAR; INTRAVENOUS ONCE
Status: COMPLETED | OUTPATIENT
Start: 2024-06-17 | End: 2024-06-17

## 2024-06-17 RX ORDER — PREDNISONE 20 MG/1
40 TABLET ORAL ONCE
Status: COMPLETED | OUTPATIENT
Start: 2024-06-17 | End: 2024-06-17

## 2024-06-17 RX ORDER — ACETAMINOPHEN 325 MG/1
975 TABLET ORAL ONCE
Status: COMPLETED | OUTPATIENT
Start: 2024-06-17 | End: 2024-06-17

## 2024-06-17 RX ORDER — LIDOCAINE 4 G/G
1 PATCH TOPICAL ONCE
Status: DISCONTINUED | OUTPATIENT
Start: 2024-06-17 | End: 2024-06-17 | Stop reason: HOSPADM

## 2024-06-17 RX ADMIN — ACETAMINOPHEN 975 MG: 325 TABLET ORAL at 12:43

## 2024-06-17 RX ADMIN — PREDNISONE 40 MG: 20 TABLET ORAL at 12:45

## 2024-06-17 RX ADMIN — LIDOCAINE 1 PATCH: 4 PATCH TOPICAL at 12:41

## 2024-06-17 RX ADMIN — KETOROLAC TROMETHAMINE 15 MG: 15 INJECTION, SOLUTION INTRAMUSCULAR; INTRAVENOUS at 12:37

## 2024-06-17 RX ADMIN — HYDROMORPHONE HYDROCHLORIDE 0.5 MG: 1 INJECTION, SOLUTION INTRAMUSCULAR; INTRAVENOUS; SUBCUTANEOUS at 12:36

## 2024-06-17 RX ADMIN — DIAZEPAM 5 MG: 5 TABLET ORAL at 12:45

## 2024-06-17 ASSESSMENT — ACTIVITIES OF DAILY LIVING (ADL)
ADLS_ACUITY_SCORE: 33
ADLS_ACUITY_SCORE: 35
ADLS_ACUITY_SCORE: 37

## 2024-06-17 ASSESSMENT — COLUMBIA-SUICIDE SEVERITY RATING SCALE - C-SSRS
2. HAVE YOU ACTUALLY HAD ANY THOUGHTS OF KILLING YOURSELF IN THE PAST MONTH?: NO
1. IN THE PAST MONTH, HAVE YOU WISHED YOU WERE DEAD OR WISHED YOU COULD GO TO SLEEP AND NOT WAKE UP?: NO
6. HAVE YOU EVER DONE ANYTHING, STARTED TO DO ANYTHING, OR PREPARED TO DO ANYTHING TO END YOUR LIFE?: NO

## 2024-06-17 NOTE — ED TRIAGE NOTES
Pt with L neck, L shoulder, whole lower back pain since last Tuesday. Seen in ER 6/13 and dx with torticollis and rx'ed diazapam and robaxin which have not been helping. Heat and cold also not helping. No CP. L arm tingling worse with turning neck. Pt alert and ambulatory with slow stiff gait.      Triage Assessment (Adult)       Row Name 06/17/24 1201          Triage Assessment    Airway WDL WDL        Respiratory WDL    Respiratory WDL WDL        Skin Circulation/Temperature WDL    Skin Circulation/Temperature WDL WDL        Cardiac WDL    Cardiac WDL WDL        Peripheral/Neurovascular WDL    Peripheral Neurovascular WDL WDL        Cognitive/Neuro/Behavioral WDL    Cognitive/Neuro/Behavioral WDL WDL

## 2024-06-17 NOTE — Clinical Note
Eulalia Mendez was seen and treated in our emergency department on 6/17/2024.  She may return to work on 06/19/2024.       If you have any questions or concerns, please don't hesitate to call.      Sabra Ferrari PA-C

## 2024-06-17 NOTE — ED PROVIDER NOTES
IBinta have reviewed the documentation, personally taken the patient's history, performed an exam and agree with the physical finds, diagnosis and management plan.    HPI:  60 y/o F here w c/o neck pain. Turned neck at work w sudden pain neck worse w movement. Seen on 6/13 for same CT head/c-spine obtained neg.  Worsening pain, radiating all left sided neck to left arm. Refuses to move the neck due to pain. No fevers. Taking valium/robaxin rx from ED visit as well as some old vicodin/lido patch.  No nsaids.     Physical Exam: Reproducible tenderness to palpation with palpable spasm over the course of the left paracervical muscle extending down into the trapezius.  She is able to range her neck, but does so with some pain and ranging is somewhat limited.  She does not however have any midline tenderness to palpation and is neuro intact with 5 out of 5 upper lower extremity strength    MDM: Symptoms are consistent with muscle spasm of the trapezius and paracervical muscles.  She has an area of point tenderness to palpation of the trapezius on examination with increased spasm    ED Course/workup: Patient treated symptomatically.  Given trigger point injection, please see PA note for same.  During this procedure she was breath-holding, and did desat into the 80s transiently but came back up spontaneously.  A chest x-ray was obtained to rule out a procedural complication of a pneumothorax and negative.  She has never been hypoxic outside of that breath-holding episode during the trigger point injection.      ED Course as of 06/17/24 1355   Mon Jun 17, 2024   1352 Chest XR,  PA & LAT  Chest x-ray independently interpreted by myself without visualized pneumothorax         Final Diagnosis:     ICD-10-CM    1. Trapezius muscle spasm  M62.838             I personally saw the patient and performed a substantive portion of the visit including all aspects of the medical decision making.  I personally made/approved the  management plan and take responsibility for the patient management.     MD Vinny Fragoso Zabrina N, MD  06/17/24 1936

## 2024-06-17 NOTE — ED PROVIDER NOTES
EMERGENCY DEPARTMENT ENCOUNTER      NAME: Eulalia Mendez  AGE: 59 year old female  YOB: 1965  MRN: [id number]  EVALUATION DATE & TIME: 6/17/2024 12:08 PM    PCP: Cindy Andre    ED PROVIDER: Sabra Ferrari PA-C      Chief Complaint   Patient presents with    Torticollis    Shoulder Pain    Back Pain         FINAL IMPRESSION:  1. Trapezius muscle spasm    2. Spasmodic torticollis        MEDICAL DECISION MAKING:    Pertinent Labs & Imaging studies reviewed. (See chart for details)  Eulalia Mendez is a 59 year old female with a pertinent history of hypothyroidism, depression, chronic pain, GERD, hypertension, RA, prediabetes who presents to this ED via private vehicle for evaluation of neck pain. About 5 days ago the patient was at work when she turned her head and had sudden onset neck pain, worse on the left side. She was seen in our ED on 6/14/24 and had negative CT imaging of the head and cervical spine without acute findings. She was given dilaudid, valium and toradol with improvement of symptoms. Since then, she has had worsening pain now radiating to the left shoulder and arm. She called the nurse line and was recommended to return to the ED. on my evaluation, patient was vitally stable however, did appear uncomfortable and in pain.  Significant decreased range of motion at the neck without midline tenderness.  Tenderness to the left paraspinal muscles, trapezius and down into the shoulder.  No bony tenderness to palpation.  5 out of 5 strength and sensation bilateral upper and lower extremities.  Pupils equal round and reactive to light next ocular movements intact.    With recent negative head and C-spine imaging without injury, neurologic symptoms, fever I did not feel workup including CBC, BMP, MRI of the cervical spine indicated.  Low suspicion for meningitis and do not feel lumbar puncture is indicated at this time.  She does have a focal point tenderness in the left trapezius and after  medications given here with Toradol, Valium, Dilaudid, prednisone she was feeling significantly improved.  With point tenderness discussed trigger point injection and patient agreeable.  During the procedure, patient became short of breath, desaturations dropped to the high 80s and had increased pain.  After muscle spasm was massaged pain was significantly improved and O2 saturation returned to normal.  Did discuss chest x-ray to rule out pneumothorax.  This was independently interpreted myself did not show any obvious pneumothorax.  Formal read with negative chest x-ray.  Patient reassured and feeling improved.  Given soft neck brace as she had been using towels at home with improvement of symptoms, given a few more tablets of Valium and discussed Tylenol, ibuprofen, Valium and close follow-up with primary care.  We discussed reasons to return to the emergency department and all questions were answered best my ability.  She was discharged home in stable condition.    Medical Decision Making  Obtained supplemental history:Supplemental history obtained?: Family Member/Significant Other  Reviewed external records: External records reviewed?: Outpatient Record: ED visit 6/13/24 negative CT head and cervical spine. Feeling better after medications.   Care impacted by chronic illness:Chronic Pain, Hypertension, and Other: RA  Care significantly affected by social determinants of health:Access to Medical Care  Did you consider but not order tests?: Work up considered but not performed and documented in chart, if applicable  Did you interpret images independently?: Independent interpretation of ECG and images noted in documentation, when applicable.  Consultation discussion with other provider:Did you involve another provider (consultant, MH, pharmacy, etc.)?: No  Discharge. I prescribed additional prescription strength medication(s) as charted. See documentation for any additional details.     ED COURSE:  12:10 PM I met  with the patient, obtained history, performed an initial exam, and discussed options and plan for diagnostics and treatment here in the ED.    12:25 PM I staffed the patient with Dr. Casillas.    1:00 PM Patient feeling improved after medications given here. Discussed trigger point injection. Patient had onset of some shortness of breath and O2 sats went down to the high 80's. After massage and muscle spasm resolved, patient feeling better and O2 sats immediately went back to normal. Discussed chest x-ray to rule out pneumothorax and patient agreeable. No chest pain or shortness of breath at this time.     2:06 PM Patient discharged after being provided with extensive anticipatory guidance and given return precautions, importance of PCP follow-up emphasized.    At the conclusion of the encounter I discussed the results of all of the tests and the disposition. The questions were answered. The patient or family acknowledged understanding and was agreeable with the care plan.     MEDICATIONS GIVEN IN THE EMERGENCY:  Medications   HYDROmorphone (PF) (DILAUDID) injection 0.5 mg (0.5 mg Intravenous $Given 6/17/24 1236)   ketorolac (TORADOL) injection 15 mg (15 mg Intravenous $Given 6/17/24 1237)   acetaminophen (TYLENOL) tablet 975 mg (975 mg Oral $Given 6/17/24 1243)   predniSONE (DELTASONE) tablet 40 mg (40 mg Oral $Given 6/17/24 1245)   diazepam (VALIUM) tablet 5 mg (5 mg Oral $Given 6/17/24 1245)       NEW PRESCRIPTIONS STARTED AT TODAY'S ER VISIT  Discharge Medication List as of 6/17/2024  2:06 PM        START taking these medications    Details   predniSONE (DELTASONE) 20 MG tablet Take 2 tablets (40 mg) by mouth daily for 4 days, Disp-8 tablet, R-0, Local Print                  =================================================================    HPI:    Patient information was obtained from: The patient and     Use of Interpretor: N/A         Eulalia Mendez is a 59 year old female with a pertinent history of  hypothyroidism, depression, chronic pain, GERD, hypertension, RA, prediabetes who presents to this ED via private vehicle for evaluation of neck pain. About 5 days ago the patient was at work when she turned her head and had sudden onset neck pain, worse on the left side. She was seen in our ED on 6/14/24 and had negative CT imaging of the head and cervical spine without acute findings. She was given dilaudid, valium and toradol with improvement of symptoms. Since then, she has had worsening pain now radiating to the left shoulder and arm. She called the nurse line and was recommended to return to the ED. On my evaluation, the patient denies any fevers. She reports headaches since the onset of her neck pain. No nausea or vomiting. No chest pain or shortness of breath. Reports weakness in the left arm due to pain and tingling. No blurred vision but has noticed intermittent floaters. Has been taking valium, robaxin and vicodin at home without relief, no NSAID use. No falls or trauma. No other concerns voiced at this time.       REVIEW OF SYSTEMS:  Negative unless otherwise stated in the above HPI.       PAST MEDICAL HISTORY:  No past medical history on file.    PAST SURGICAL HISTORY:  No past surgical history on file.        CURRENT MEDICATIONS:    No current facility-administered medications for this encounter.    Current Outpatient Medications:     diazepam (VALIUM) 5 MG tablet, Take 1 tablet (5 mg) by mouth every 6 hours as needed for muscle spasms, Disp: 8 tablet, Rfl: 0    methocarbamol (ROBAXIN) 750 MG tablet, Take 1 tablet (750 mg) by mouth 4 times daily as needed for muscle spasms, Disp: 20 tablet, Rfl: 0    [START ON 6/18/2024] predniSONE (DELTASONE) 20 MG tablet, Take 2 tablets (40 mg) by mouth daily for 4 days, Disp: 8 tablet, Rfl: 0    baclofen (LIORESAL) 10 MG tablet, Take 10 mg by mouth 2 at night every night plus one during the day as needed., Disp: , Rfl:     betamethasone dipropionate (DIPROSONE) 0.05  % external cream, APPLY TO AFFECTED AREA TOPICALLY EVERY DAY, Disp: 60 g, Rfl: 3    clobetasol (TEMOVATE) 0.05 % external ointment, APPLY TO FEET TWICE DAILY X 4 DAYS PER WEEK, Disp: 60 g, Rfl: 3    Deucravacitinib (SOTYKTU) 6 MG TABS, Take 1 tablet by mouth daily, Disp: 30 tablet, Rfl: 5    esomeprazole (NEXIUM) 40 MG DR capsule, Take 40 mg by mouth every morning (before breakfast) Take 30-60 minutes before eating., Disp: , Rfl:     fluticasone (FLONASE) 50 MCG/ACT nasal spray, Spray 2 sprays in nostril daily, Disp: , Rfl:     hydrochlorothiazide (HYDRODIURIL) 25 MG tablet, Take 25 mg by mouth daily, Disp: , Rfl:     HYDROcodone-acetaminophen (NORCO) 5-325 MG tablet, Take 1 tablet by mouth 2 times daily as needed for pain, Disp: , Rfl:     hydrOXYzine (VISTARIL) 25 MG capsule, Take 25 mg by mouth nightly as needed for itching, Disp: , Rfl:     levothyroxine (SYNTHROID/LEVOTHROID) 150 MCG tablet, Take 150 mcg by mouth daily, Disp: , Rfl:     LORazepam (ATIVAN) 1 MG tablet, Take 0.5-1 mg by mouth nightly as needed for anxiety, Disp: , Rfl:     losartan (COZAAR) 100 MG tablet, Take 100 mg by mouth daily, Disp: , Rfl:     prazosin (MINIPRESS) 2 MG capsule, Take 1 capsule by mouth at bedtime, Disp: , Rfl:     rosuvastatin (CRESTOR) 20 MG tablet, Take 1 tablet by mouth at bedtime, Disp: , Rfl:     sertraline (ZOLOFT) 100 MG tablet, Take 100 mg by mouth daily, Disp: , Rfl:     simethicone (MYLICON) 125 MG chewable tablet, Take 125 mg by mouth daily as needed for flatulence or cramping, Disp: , Rfl:     traMADol (ULTRAM) 50 MG tablet, Take 50 mg by mouth 2 times daily, Disp: , Rfl:     ustekinumab (STELARA) 90 MG/ML, INJECT 1 SYRINGE (90 MG) SUBCUTANEOUSLY EVERY 3 MONTHS, Disp: 1 mL, Rfl: 3      ALLERGIES:  Allergies   Allergen Reactions    Ace Inhibitors Cough       FAMILY HISTORY:  Family History   Problem Relation Age of Onset    Melanoma No family hx of     Skin Cancer No family hx of        SOCIAL HISTORY:   Social  "History     Socioeconomic History    Marital status:    Tobacco Use    Smoking status: Every Day    Smokeless tobacco: Never     Social Determinants of Health     Financial Resource Strain: Low Risk  (5/30/2024)    Received from [facility] [employer] [facility]    Financial Resource Strain     Difficulty of Paying Living Expenses: 3   Food Insecurity: No Food Insecurity (5/30/2024)    Received from [facility] [employer] [facility]    Food Insecurity     Worried About Running Out of Food in the Last Year: 1   Transportation Needs: No Transportation Needs (5/30/2024)    Received from [facility] [employer] [facility]    Transportation Needs     Lack of Transportation (Medical): 1   Social Connections: Socially Integrated (5/30/2024)    Received from [facility] [employer] [facility]    Social Connections     Frequency of Communication with Friends and Family: 0   Housing Stability: Low Risk  (5/30/2024)    Received from [facility] [employer] [facility]    Housing Stability     Unable to Pay for Housing in the Last Year: 1       VITALS:  Patient Vitals for the past 24 hrs:   BP Temp Temp src Pulse Resp SpO2 Height Weight   06/17/24 1400 119/72 -- -- 72 -- 92 % -- --   06/17/24 1345 -- -- -- 74 -- 93 % -- --   06/17/24 1330 112/78 -- -- 73 -- 93 % -- --   06/17/24 1315 -- -- -- 76 -- 94 % -- --   06/17/24 1300 138/75 -- -- 83 -- 90 % -- --   06/17/24 1202 131/83 97.2  F (36.2  C) Oral 83 18 94 % 1.651 m (5' 5\") 74.4 kg (164 lb)       PHYSICAL EXAM    Constitutional: Well developed, Well nourished, NAD  HENT: Normocephalic, Atraumatic, Bilateral external ears normal, Oropharynx normal, mucous membranes moist, Nose normal.   Neck: Significantly decreased range of motion of the neck due to pain, tenderness to palpation over the paracervical spinal muscles and trapezius with significant point tenderness and spasm.  Eyes: PERRL, EOMI, Conjunctiva normal, No " discharge.   Respiratory: Normal breath sounds, No respiratory distress, No wheezing, Speaks full sentences easily. No cough.  Cardiovascular: Normal heart rate, Regular rhythm, No murmurs, No rubs, No gallops. Chest wall nontender.  GI: Soft, No tenderness, No masses, No flank tenderness. No rebound or guarding.  Musculoskeletal: Decreased range of motion of the left shoulder due to pain. No bony tenderness, distal CMS intact. Otherwise good range of motion in all major joints.   Integument: Warm, Dry, No erythema, No rash. No petechiae.  Neurologic: 5/5 strength and sensation in bilateral upper and lower extremities. Alert & oriented x 3, Normal motor function, Normal sensory function, No focal deficits noted. Normal gait.  Psychiatric: Affect normal, Judgment normal, Mood normal. Cooperative.    LAB:  All pertinent labs reviewed and interpreted.  No results found for this or any previous visit (from the past 24 hour(s)).      RADIOLOGY:  Reviewed all pertinent imaging. Please see official radiology report.  Chest XR,  PA & LAT   Final Result   IMPRESSION: Negative chest.          PROCEDURES:   PROCEDURE: Trigger Point Injection   INDICATIONS: Muscle spasm, torticollis   PROCEDURE PROVIDER: Sabra Ferrari PA-C   SITE: Left trapezius   MEDICATION: 5 mLs of 1% Lidocaine without epinephrine   NOTE: The skin overlying the site for injection was prepped with alcohol.  Using sterile technique I injected the above medication.  The patient had good response to the procedure.    COMPLICATIONS: Patient tolerated procedure well, without complication          Sabra Ferrari PA-C  Emergency Medicine  [facility]  6/17/2024      Sabra Ferrari PA-C  06/17/24 0190

## 2024-06-17 NOTE — DISCHARGE INSTRUCTIONS
You were seen and evaluated here in the ED for evaluation of neck pain. Muscle spasm here and improved after medications given. Recommend close follow-up with primary care in 3-5 days if not improving.    Take tylenol and ibuprofen for symptoms. Will give a few more tablets of diazepam. Also recommend prednisone 40 mg daily for the next 4 days.     If you develop onset of weakness, sudden worsening headache, vomiting, fever or other concerns please return to the ED.

## 2024-09-17 DIAGNOSIS — L40.3 PUSTULAR PSORIASIS OF PALMS AND SOLES: ICD-10-CM

## 2024-09-25 RX ORDER — USTEKINUMAB 90 MG/ML
INJECTION, SOLUTION SUBCUTANEOUS
Qty: 1 ML | Refills: 3 | Status: SHIPPED | OUTPATIENT
Start: 2024-09-25

## 2025-02-17 ENCOUNTER — TELEPHONE (OUTPATIENT)
Dept: PHARMACY | Facility: CLINIC | Age: 60
End: 2025-02-17
Payer: COMMERCIAL

## 2025-02-17 NOTE — TELEPHONE ENCOUNTER
Pt is due for follow up with Leda BUITRAGO.     Call placed to pt to initiate scheduling on 2/17/25    Outcome: LVM. Due to MyC inactivity, will send a letter in mail reminding to schedule follow up

## 2025-03-09 ENCOUNTER — HEALTH MAINTENANCE LETTER (OUTPATIENT)
Age: 60
End: 2025-03-09

## 2025-03-13 ENCOUNTER — VIRTUAL VISIT (OUTPATIENT)
Dept: PHARMACY | Facility: CLINIC | Age: 60
End: 2025-03-13
Attending: PHYSICIAN ASSISTANT
Payer: COMMERCIAL

## 2025-03-13 DIAGNOSIS — I10 HTN (HYPERTENSION): ICD-10-CM

## 2025-03-13 DIAGNOSIS — F43.10 PTSD (POST-TRAUMATIC STRESS DISORDER): ICD-10-CM

## 2025-03-13 DIAGNOSIS — F41.9 ANXIETY: ICD-10-CM

## 2025-03-13 DIAGNOSIS — L40.8 OTHER PSORIASIS: Primary | ICD-10-CM

## 2025-03-13 DIAGNOSIS — R52 PAIN: ICD-10-CM

## 2025-03-13 DIAGNOSIS — E78.5 HLD (HYPERLIPIDEMIA): ICD-10-CM

## 2025-03-13 DIAGNOSIS — F32.A DEPRESSION: ICD-10-CM

## 2025-03-13 DIAGNOSIS — E03.9 HYPOTHYROIDISM: ICD-10-CM

## 2025-03-13 DIAGNOSIS — J30.9 ALLERGIC RHINITIS, UNSPECIFIED SEASONALITY, UNSPECIFIED TRIGGER: ICD-10-CM

## 2025-03-13 DIAGNOSIS — K21.00 GASTROESOPHAGEAL REFLUX DISEASE WITH ESOPHAGITIS: ICD-10-CM

## 2025-03-13 NOTE — PROGRESS NOTES
Medication Therapy Management (MTM) Encounter    ASSESSMENT:                            Medication Adherence/Access: No issues identified.    Pustular psoriasis: Would still benefit from additional therapy based on reported symptoms but needs follow-up with Dr. Brandt. Patient is hesitant to change medications because Stelara has been helpful so she does not want to discontinue, which would need to be the case if switching to an alternate immunomodulator such as previously considered Sotyktu. May add Otezla to current regimen, but patient has concern for potential GI adverse effects so has elected to decline initiation in the past (and again today). Since patient is content with current regimen and follow-ing up with Margarita Brandt as scheduled, reasonable to continue current regimen as prescribed and encourage patient to reach out regarding potential medication changes as interested.     Hypertension: Stable, controlled.      Hyperlipidemia: Stable, controlled.     Hypothyroidism: Stable, controlled.     Pain: Stable, controlled.     Anxiety, Depression, & PTSD: Stable, controlled.     GERD: Stable, controlled.     Allergies: Stable, controlled.     PLAN:                            Continue current regimen as prescribed.   Consider receiving the following vaccination(s): COVID-19 booster, pneumonia (PCV-20 preferred), shingles (Shingrix; 2 dose-series), and RSV.    Follow-up: Rikki 6/20/25, 10/17/25; MTM every 6 months and as needed     SUBJECTIVE/OBJECTIVE:                          Eulalia Mendez is a 59 year old female called for a follow-up visit.      Reason for visit: Med check in.    Allergies/ADRs: Reviewed in chart.  Past Medical History: Reviewed in chart.  Tobacco: She reports that she has been smoking. She has never used smokeless tobacco. Nicotine/Tobacco Cessation Plan: Information offered: Patient not interested at this time (4-5 cigarettes per day)  Alcohol: Reviewed in chart.    Medication  Adherence/Access: no issues reported.    Pustular psoriasis:   - Stelara (ustekinumab) 90 mg every 12 weeks.   - Betamethasone 0.05% cream once daily.   - Clobetasol 0.05% ointment twice daily 4 days per week (to feet).   03/13/25: Patient reports effect of current regimen (previous symptoms better with stelara but still some symptoms persist) and denies adverse effects such as injection site reactions, headaches, and noticeable immunosuppression (URI, etc). Current symptoms include: pustules on feet, hands, nape of head, & underneath nails. Patient is not having troubles with administration. Adherence: no issues. Patient's questions/concerns at this time: none. Patient still declines switch from Stelara to Sotyktu and declines addition of Otezla due to potential GI effects.      3/22/24: Patient presents with a preference to explore Sotyktu coverage prior to initiating Otezla due to concerns regarding potential gastrointestinal adverse effects associated with Otezla. Reports fluctuating between diarrhea and constipation, finding relief through probiotic drinks and avoidance of NSAIDs. Exhibits flares primarily localized underneath nailbeds, fingertips, hands, bottom of feet, with occasional involvement on legs, trunk, neck, and face. Severe pain in feet, particularly between toes, impedes mobility. Initiated Stelara therapy at [facility] in 2015.  Identifies a cyclic pattern where flares subside during the initial two post-Stelara injection (~80% improvement per patient), then resurge during the third month prior to giving next injection. Patient has already signed up for copay assistance card.      Specialist: Margarita Brandt PA-C, Dermatology. Last visit on 3/15/24. The following was recommended:   # Pustular psoriasis of palms and soles   Chronic problem. Well-controlled on Stelara in general, but does start to get flares again about 3 weeks prior to next injection. She tries to manage with topicals during this  time, but it now needing clobetasol daily during this period. Because patient is not well controlled we need to add something else to help her. she has tried and failed numerous other medications (see above). Light therapy makes this problem worse for her, but she does have a hand unit at home.   - Labs ordered - CBC, CMP and TB gold  - Continue Stelara 90 mg/ml every 3 months; refilled today - has tried to get insurance approval for stelara injections every 2mo, but this has been denied  - Ordered Otezla to take 30mg po BID, If approved by insurance, if not will have to try TYK #2 - Deucravacitinib - patient preference is the Deucravacitinib because of the once daily dosing but I explained to her that unfortunately insurance may require a trial and failure of otezla before it will cover this medication.  -Discussed with patient risks and benefits of Apremilast. Discussed risks including GI upset, nausea, vomiting, diarrhea, depression, weight loss, headaches, and URI. The patient denies a history of depression, current changes in mood or suicidal ideation. The patient will contact clinic if they experience any changes in mood.   -Patient is not pregnant or breastfeeding  -Medication list was reviewed for drug interactions(including CYP-450 inducers) and none were noted.   -The patient was instructed not to crush, chew or split the tablet and that the tab may be taken with or without food.   -Dosing will be as follows: Day 1: 10 mg in morning, Day 2: 10 mg in morning and 10 mg in evening, Day 3: 10 mg in morning and 20 mg in evening, Day 4: 20 mg in morning and 20 mg in evening, Day 5: 20 mg in morning and 30 mg in evening, Day 6 and thereafter: 30 mg twice daily.   - Continue clobetasol 0.05% ointment BID 4 days/week; refilled today.  - smoking cessation recommended.  - ordered a medication management referral  Follow-up: 6 month(s) in-person, or earlier for new or changing lesions     Previous treatment:   -  Cosenyx: no effect.     Pre-Biologic Screenings      Hep B Surface Antibody No record of completion    Hep B Core Antibody  No record of completion    Hep B Surface Antigen No record of completion    Hep C Antibody  Non-reactive (9/8/20; [facility])    HIV Antigen Antibody No record of completion    Quantiferon TB Gold Negative (3/15/24)    CBC 3/15/24   CMP 3/15/24 (eGFR 87 mL/min)      Immunization History   Covid-19 vaccine  Due to receive   Influenza (annual) Declines wanting to receive; used to get very sick from flu vaccines.   Tetanus/Tdap Up-to-date   Pneumococcal Due to receive PCV-20   Shingrix (for ages 18-49 at increased risk AND >= 50 years old)  Due to receive   Hepatitis B vaccine No serologies to assess at this time   RSV (for ages 60-74 at increased risk AND >= 75 years old)  Due to receive   All patients on biologics should avoid live vaccines (varicella/VZV, intranasal influenza, MMR, or yellow fever vaccine (if traveling))      Hypertension:   - Hydrochlorothiazide 25 mg once daily.  - Losartan 100 mg once daily.      03/13/25: No reported issues at this time.     3/22/24: Patient reports no current medication side effects (denies orthostatic hypotension, dizziness, fatigue, urinary frequency). Patient does not self-monitor blood pressure; she notes in-clinic BP monitoring usually is 136/80 mmHg. No longer taking metoprolol  once daily (removed from list).     Hyperlipidemia:   - Rosuvastatin 20 mg once daily.      03/13/25: No reported issues at this time.       3/22/24: Patient reports the following medication side effects: frequent cramping/sore legs. Does note it improves if she eats a banana.    Hypothyroidism:   - Levothyrozine 150 mcg once daily.    03/13/25: Patient is having the following symptoms: none.      Pain:    - Baclofen 10 mg x2 at bedtime & 1 during day as needed.  - Hydrocodone-acetaminophen 5-325 mg twice daily as needed.  - Tramadol 50 mg twice daily.    03/13/25: Patient  feels that current therapy is effective. Patient reports no current medication side effects (no fatigue, nausea). Takes pain medication a couple of times per week with effect. Works with primary care provider.    Anxiety, Depression, & PTSD:  - Sertraline 100 mg once daily.  - Lorazepam 1 mg x 0.5-1 tablet at bedtime as needed anxiety.  - Prazosin 2 mg at bedtime for PTSD.    03/13/25: Patient reports no current medication side effects. Has not needed lorazepam lately; does work if taken.  Patient is not seeing a therapist. Patient is not seeing a psychiatrist. Works with primary care provider.    GERD:  - Esomeprazole 40 mg 30-60 mg before breakfast once daily.     03/13/25: Patient reports no current symptoms. Patient feels that current regimen is effective. The patient does have a history of GI bleed. The patient does not notice symptoms if they miss a dose.    Allergies:  - Flonase x2 sprays once daily as needed.    03/13/25: Patient reports no current medication side effects. Patient feels that current therapy is effective.      Today's Vitals: There were no vitals taken for this visit.  ----------------  I spent 20 minutes with this patient today. All changes were made via collaborative practice agreement with Margarita Brandt PA-C.     A summary of these recommendations was sent via [employer] and was sent via clinic portal.    Leda Sanchez, Pharm.D.  Medication Therapy Management Pharmacist   [facility]    Telemedicine Visit Details  The patient's medications can be safely assessed via a telemedicine encounter.  Type of service:  Telephone visit  Originating Location (pt. Location): Home    Distant Location (provider location):  Off-site  Start Time: 11:30 AM  End Time: 11:50 AM     Medication Therapy Recommendations  No medication therapy recommendations to display

## 2025-03-13 NOTE — Clinical Note
Mila :) I met with Eulalia and went through her medications again. Although her symptoms are still not under control, she is currently not interested in medication changes. She has a follow-up with you in June so I figured you can discuss and assess more then. Thanks!

## 2025-03-13 NOTE — PATIENT INSTRUCTIONS
"Recommendations from today's MTM visit:                                                    MTM (medication therapy management) is a service provided by a clinical pharmacist designed to help you get the most of out of your medicines.      Continue current regimen as prescribed.   Consider receiving the following vaccination(s): COVID-19 booster, pneumonia (PCV-20 preferred), shingles (Shingrix; 2 dose-series), and RSV.    Follow-up: Rikki 6/20/25, 10/17/25; MTM every 6 months and as needed     It was great speaking with you today.  I value your experience and would be very thankful for your time in providing feedback in our clinic survey. In the next few days, you may receive an email or text message from [email or web address] with a link to a survey related to your  clinical pharmacist.\"     To schedule another MTM appointment, please call the clinic directly or you may call the MTM scheduling line at [phone].    My Clinical Pharmacist's contact information:                                                      Please feel free to contact me with any questions or concerns you have.      Leda Sanchez, Pharm.D.  Medication Therapy Management Pharmacist   [facility]  MTM Scheduling Line: [phone]    "

## 2025-03-17 ENCOUNTER — TELEPHONE (OUTPATIENT)
Dept: DERMATOLOGY | Facility: CLINIC | Age: 60
End: 2025-03-17
Payer: COMMERCIAL

## 2025-03-17 NOTE — TELEPHONE ENCOUNTER
Health Call Center    Phone Message    May a detailed message be left on voicemail: no     Reason for Call: Medication Question or concern regarding medication   Prescription Clarification  Name of Medication: ustekinumab (STELARA) 90 MG/ML  Prescribing Provider: Margarita Brandt   Pharmacy: [facility] [place]   What on the order needs clarification? Pharmacy is wondering if the clinic has received a pre-authorization form for the listed medication. Amber stated that she will just re-fax over the form again. Please call [phone] to advise. Thank you.      Action Taken: Message routed to:  [facility]    Travel Screening: Not Applicable     Date of Service:

## 2025-03-17 NOTE — TELEPHONE ENCOUNTER
PA Initiation    Medication: STELARA 90 MG/ML SC [employer]  Insurance Company: [employer] - Phone [phone] Fax [phone]  Pharmacy Filling the Rx: [facility] [place]  Filling Pharmacy Phone: [phone]  Filling Pharmacy Fax: [phone]  Start Date: 3/17/2025         Thank you,     Florentin Esposito [facility]  Pharmacy Clinic [facility]  [email or web address]   Phone: [phone]  Fax: [phone]

## 2025-03-19 NOTE — TELEPHONE ENCOUNTER
Prior Authorization Approval    Medication: STELARA 90 MG/ML SC [employer]  Authorization Effective Date: 2/19/2025  Authorization Expiration Date: 3/19/2026  Approved Dose/Quantity: 1 ml per 84 days  Reference #: [id number]   Insurance Company: [employer] - Phone [phone] Fax [phone]  Expected CoPay: $    CoPay Card Available:      Financial Assistance Needed:   Which Pharm[facility] is filling the prescription[facility] [place]  Pharmacy Notified:   Patient Notified:            Thank you,     Florentin Esposito [facility]  Pharmacy Clinic [facility]  [email or web address]   Phone: [phone]  Fax: [phone]

## 2025-03-25 ENCOUNTER — MYC MEDICAL ADVICE (OUTPATIENT)
Dept: PHARMACY | Facility: CLINIC | Age: 60
End: 2025-03-25
Payer: COMMERCIAL

## 2025-03-25 DIAGNOSIS — L40.3 PUSTULAR PSORIASIS OF PALMS AND SOLES: ICD-10-CM

## 2025-03-27 RX ORDER — USTEKINUMAB 90 MG/ML
90 INJECTION, SOLUTION SUBCUTANEOUS
Qty: 1 ML | Refills: 1 | OUTPATIENT
Start: 2025-03-27

## 2025-03-27 NOTE — TELEPHONE ENCOUNTER
Patient followed by MTM pharmacy team. Appropriate to authorize Skyrizi refills at this time and continue following-up with MTM and dermatology providers going forward.     Leda Sanchez [facility]

## 2025-05-23 ENCOUNTER — TELEPHONE (OUTPATIENT)
Dept: DERMATOLOGY | Facility: CLINIC | Age: 60
End: 2025-05-23
Payer: COMMERCIAL

## 2025-05-23 NOTE — TELEPHONE ENCOUNTER
PA Initiation    Medication: STEQEYMA 90 MG/ML SC [employer]  Insurance Company: [employer] - Phone [phone] Fax [phone]  Pharm[facility] the Rx[facility] [place]  Filling Pharmacy Phone: [phone]  Filling Pharmacy Fax: [phone]  Start Date: 5/23/2025         Thank you,     Florentin Esposito [facility]  Pharmacy Clinic [facility]  [email or web address]   Phone: [phone]  Fax: [phone]

## 2025-05-28 NOTE — TELEPHONE ENCOUNTER
Prior authorization approved but coverage will not start until 7/1/25. Sent [employer] message to patient informing her and asking if she's able to fill Stelara and utilize her savings card until 7/1 at her current pharmacy.    Thank you,     Florentin Esposito, [facility]  Pharmacy Clinic [facility]  [email or web address]   Phone: [phone]  Fax: [phone]

## 2025-05-28 NOTE — TELEPHONE ENCOUNTER
Prior Authorization Approval    Medication: STEQEYMA 90 MG/ML SC SOSY  Authorization Effective Date: 7/1/2025  Authorization Expiration Date: 3/19/2026  Approved Dose/Quantity: 1 ml per 84 days   Reference #: [id number]   Insurance Company: [employer] - Phone [phone] Fax [phone]  Expected CoPay: $    CoPay Card Available:      Financial Assistance Needed:   Which [facility] is filling the prescription[facility] [address] [place] [address] [place]  Pharmacy Notified:   Patient Notified:        Thank you,     Florentin Esposito [facility]  Pharmacy Clinic [facility]  [email or web address]   Phone: [phone]  Fax: [phone]

## 2025-06-16 ENCOUNTER — TELEPHONE (OUTPATIENT)
Dept: DERMATOLOGY | Facility: CLINIC | Age: 60
End: 2025-06-16
Payer: COMMERCIAL

## 2025-06-16 DIAGNOSIS — L40.8 OTHER PSORIASIS: ICD-10-CM

## 2025-06-16 RX ORDER — USTEKINUMAB 90 MG/ML
90 INJECTION, SOLUTION SUBCUTANEOUS
Qty: 1 ML | Refills: 1 | OUTPATIENT
Start: 2025-06-16

## 2025-06-16 RX ORDER — USTEKINUMAB 90 MG/ML
90 INJECTION, SOLUTION SUBCUTANEOUS SEE ADMIN INSTRUCTIONS
Qty: 1 ML | Refills: 0 | OUTPATIENT
Start: 2025-06-16

## 2025-06-16 NOTE — PROGRESS NOTES
Sending ustekinumab prescription to [facility] per patient request. Called [facility] to reverse claim and cancel Stelara rx.     Leda Sanchez RP

## 2025-06-16 NOTE — TELEPHONE ENCOUNTER
PA Initiation    Medication: STEQEYMA 90 MG/ML [facility]  Insurance Company: [employer] - Phone [phone] Fax [phone]  Pharmacy Filling the Rx:    Filling Pharmacy Phone:    Filling Pharmacy Fax:    Start Date: 6/16/2025

## 2025-06-18 NOTE — TELEPHONE ENCOUNTER
Prior Authorization Not Needed per Insurance    Medication: STEQEYMA 90 MG/ML SC [employer]  Insurance Company: [employer] - Phone [phone] Fax [phone]  Expected CoPay: $    Pharmacy Filling the Rx:    Pharmacy Notified:   Patient Notified:        Thank you,     Florentin Esposito [facility]  Pharmacy Clinic [facility]  [email or web address]   Phone: [phone]  Fax: [phone]

## 2025-07-01 ENCOUNTER — TELEPHONE (OUTPATIENT)
Dept: DERMATOLOGY | Facility: CLINIC | Age: 60
End: 2025-07-01
Payer: COMMERCIAL

## 2025-07-01 NOTE — TELEPHONE ENCOUNTER
PA Initiation    Medication: STEQEYMA 90 MG/ML SC [employer]  Insurance Company: [employer] - Phone [phone] Fax [phone]  Pharmacy Filling the Rx: [facility] - [place], [place] - [facility] [address]  Filling Pharmacy Phone: [phone]  Filling Pharmacy Fax: [phone]  Start Date: 7/1/2025         Thank you,     Florentin Epsosito [facility]  Pharmacy Clinic [facility]  [email or web address]   Phone: [phone]  Fax: [phone]

## 2025-07-01 NOTE — TELEPHONE ENCOUNTER
Prior Authorization Approval    Medication: STEQEYMA 90 MG/ML SC [employer]  Authorization Effective Date: 7/1/2025  Authorization Expiration Date: 7/1/2026  Approved Dose/Quantity: 1 ml per 28 days loading dose then 1 ml per 84 days maintenance dose  Reference #: [id number]   Insurance Company: [email or web address] - Phone [phone] Fax [phone]  Expected CoPay: $  475 without savings card, $0 with savings card  CoPay Card Available:      Financial Assistance Needed: Savings card enrolled  Which Pharmacy is filling the prescription: [facility] - [address]  Pharmacy Notified: Yes  Patient Notified: Yes    COPAY CARD OBTAINED    Medication: STEQEYMA 90 MG/ML SC [employer]  Sponsor: [employer]  Member ID:   BIN: [id number]  PCN: [id number]  Group: [id number]  Expected Copay: $ 475  Copay card Monthly Max Amount: $ 1,250  Copay card Annual Amount: $ 15,000         Thank you,     Florentin Esposito [facility]  Pharmacy Clinic [facility]  [email or web address]   Phone: [phone]  Fax: [phone]